# Patient Record
Sex: MALE | Race: WHITE | NOT HISPANIC OR LATINO | URBAN - METROPOLITAN AREA
[De-identification: names, ages, dates, MRNs, and addresses within clinical notes are randomized per-mention and may not be internally consistent; named-entity substitution may affect disease eponyms.]

---

## 2017-02-03 ENCOUNTER — OUTPATIENT (OUTPATIENT)
Dept: OUTPATIENT SERVICES | Facility: HOSPITAL | Age: 57
LOS: 1 days | Discharge: HOME | End: 2017-02-03

## 2017-05-13 ENCOUNTER — TRANSCRIPTION ENCOUNTER (OUTPATIENT)
Age: 57
End: 2017-05-13

## 2017-06-27 DIAGNOSIS — I49.9 CARDIAC ARRHYTHMIA, UNSPECIFIED: ICD-10-CM

## 2017-11-10 ENCOUNTER — OUTPATIENT (OUTPATIENT)
Dept: OUTPATIENT SERVICES | Facility: HOSPITAL | Age: 57
LOS: 1 days | Discharge: HOME | End: 2017-11-10

## 2017-11-10 DIAGNOSIS — Z00.8 ENCOUNTER FOR OTHER GENERAL EXAMINATION: ICD-10-CM

## 2017-12-08 ENCOUNTER — OUTPATIENT (OUTPATIENT)
Dept: OUTPATIENT SERVICES | Facility: HOSPITAL | Age: 57
LOS: 1 days | Discharge: HOME | End: 2017-12-08

## 2017-12-08 DIAGNOSIS — I49.9 CARDIAC ARRHYTHMIA, UNSPECIFIED: ICD-10-CM

## 2018-11-09 ENCOUNTER — OUTPATIENT (OUTPATIENT)
Dept: OUTPATIENT SERVICES | Facility: HOSPITAL | Age: 58
LOS: 1 days | Discharge: HOME | End: 2018-11-09

## 2018-11-09 DIAGNOSIS — I49.9 CARDIAC ARRHYTHMIA, UNSPECIFIED: ICD-10-CM

## 2018-11-09 DIAGNOSIS — Z00.8 ENCOUNTER FOR OTHER GENERAL EXAMINATION: ICD-10-CM

## 2018-11-09 LAB
ALBUMIN SERPL ELPH-MCNC: 5.2 G/DL — SIGNIFICANT CHANGE UP (ref 3.5–5.2)
ALP SERPL-CCNC: 84 U/L — SIGNIFICANT CHANGE UP (ref 30–115)
ALT FLD-CCNC: 15 U/L — SIGNIFICANT CHANGE UP (ref 0–41)
ANION GAP SERPL CALC-SCNC: 15 MMOL/L — HIGH (ref 7–14)
AST SERPL-CCNC: 15 U/L — SIGNIFICANT CHANGE UP (ref 0–41)
BILIRUB DIRECT SERPL-MCNC: <0.2 MG/DL — SIGNIFICANT CHANGE UP (ref 0–0.2)
BILIRUB INDIRECT FLD-MCNC: >0.1 MG/DL — LOW (ref 0.2–1.2)
BILIRUB SERPL-MCNC: 0.3 MG/DL — SIGNIFICANT CHANGE UP (ref 0.2–1.2)
BUN SERPL-MCNC: 19 MG/DL — SIGNIFICANT CHANGE UP (ref 10–20)
CALCIUM SERPL-MCNC: 10.2 MG/DL — HIGH (ref 8.5–10.1)
CHLORIDE SERPL-SCNC: 103 MMOL/L — SIGNIFICANT CHANGE UP (ref 98–110)
CHOLEST SERPL-MCNC: 236 MG/DL — HIGH (ref 100–200)
CO2 SERPL-SCNC: 22 MMOL/L — SIGNIFICANT CHANGE UP (ref 17–32)
CREAT SERPL-MCNC: 1.2 MG/DL — SIGNIFICANT CHANGE UP (ref 0.7–1.5)
ESTIMATED AVERAGE GLUCOSE: 126 MG/DL — HIGH (ref 68–114)
GGT SERPL-CCNC: 48 U/L — HIGH (ref 1–40)
GLUCOSE SERPL-MCNC: 96 MG/DL — SIGNIFICANT CHANGE UP (ref 70–99)
HBA1C BLD-MCNC: 6 % — HIGH (ref 4–5.6)
HCT VFR BLD CALC: 41.8 % — LOW (ref 42–52)
HDLC SERPL-MCNC: 52 MG/DL — SIGNIFICANT CHANGE UP
HGB BLD-MCNC: 14.2 G/DL — SIGNIFICANT CHANGE UP (ref 14–18)
LIPID PNL WITH DIRECT LDL SERPL: 178 MG/DL — HIGH (ref 4–129)
MAGNESIUM SERPL-MCNC: 1.9 MG/DL — SIGNIFICANT CHANGE UP (ref 1.8–2.4)
MCHC RBC-ENTMCNC: 29.9 PG — SIGNIFICANT CHANGE UP (ref 27–31)
MCHC RBC-ENTMCNC: 34 G/DL — SIGNIFICANT CHANGE UP (ref 32–37)
MCV RBC AUTO: 88 FL — SIGNIFICANT CHANGE UP (ref 80–94)
NRBC # BLD: 0 /100 WBCS — SIGNIFICANT CHANGE UP (ref 0–0)
PLATELET # BLD AUTO: 289 K/UL — SIGNIFICANT CHANGE UP (ref 130–400)
POTASSIUM SERPL-MCNC: 4.2 MMOL/L — SIGNIFICANT CHANGE UP (ref 3.5–5)
POTASSIUM SERPL-SCNC: 4.2 MMOL/L — SIGNIFICANT CHANGE UP (ref 3.5–5)
PROT SERPL-MCNC: 8.5 G/DL — HIGH (ref 6–8)
RBC # BLD: 4.75 M/UL — SIGNIFICANT CHANGE UP (ref 4.7–6.1)
RBC # FLD: 12.9 % — SIGNIFICANT CHANGE UP (ref 11.5–14.5)
SODIUM SERPL-SCNC: 140 MMOL/L — SIGNIFICANT CHANGE UP (ref 135–146)
TOTAL CHOLESTEROL/HDL RATIO MEASUREMENT: 4.5 RATIO — SIGNIFICANT CHANGE UP (ref 4–5.5)
TRIGL SERPL-MCNC: 134 MG/DL — SIGNIFICANT CHANGE UP (ref 10–149)
WBC # BLD: 9.89 K/UL — SIGNIFICANT CHANGE UP (ref 4.8–10.8)
WBC # FLD AUTO: 9.89 K/UL — SIGNIFICANT CHANGE UP (ref 4.8–10.8)

## 2018-11-10 LAB
HAV IGG SER QL IA: SIGNIFICANT CHANGE UP
HAV IGM SER-ACNC: SIGNIFICANT CHANGE UP
HAV IGM SER-ACNC: SIGNIFICANT CHANGE UP
HBV CORE AB SER-ACNC: SIGNIFICANT CHANGE UP
HBV CORE IGM SER-ACNC: SIGNIFICANT CHANGE UP
HBV SURFACE AB SER-ACNC: SIGNIFICANT CHANGE UP
HBV SURFACE AG SER-ACNC: SIGNIFICANT CHANGE UP
HBV SURFACE AG SER-ACNC: SIGNIFICANT CHANGE UP
HCV AB S/CO SERPL IA: 0.18 S/CO — SIGNIFICANT CHANGE UP
HCV AB SERPL-IMP: SIGNIFICANT CHANGE UP
T PALLIDUM AB TITR SER: NEGATIVE — SIGNIFICANT CHANGE UP

## 2018-11-12 LAB
APPEARANCE UR: CLEAR — SIGNIFICANT CHANGE UP
BILIRUB UR-MCNC: NEGATIVE — SIGNIFICANT CHANGE UP
COLOR SPEC: YELLOW — SIGNIFICANT CHANGE UP
DIFF PNL FLD: NEGATIVE — SIGNIFICANT CHANGE UP
GLUCOSE UR QL: NEGATIVE MG/DL — SIGNIFICANT CHANGE UP
KETONES UR-MCNC: NEGATIVE — SIGNIFICANT CHANGE UP
LEUKOCYTE ESTERASE UR-ACNC: NEGATIVE — SIGNIFICANT CHANGE UP
NITRITE UR-MCNC: NEGATIVE — SIGNIFICANT CHANGE UP
PH UR: 6 — SIGNIFICANT CHANGE UP (ref 5–8)
PROT UR-MCNC: NEGATIVE MG/DL — SIGNIFICANT CHANGE UP
SP GR SPEC: 1.02 — SIGNIFICANT CHANGE UP (ref 1.01–1.03)
UROBILINOGEN FLD QL: 0.2 MG/DL — SIGNIFICANT CHANGE UP (ref 0.2–0.2)

## 2018-12-07 ENCOUNTER — OUTPATIENT (OUTPATIENT)
Dept: OUTPATIENT SERVICES | Facility: HOSPITAL | Age: 58
LOS: 1 days | Discharge: HOME | End: 2018-12-07

## 2018-12-07 DIAGNOSIS — I49.9 CARDIAC ARRHYTHMIA, UNSPECIFIED: ICD-10-CM

## 2019-04-10 ENCOUNTER — RECORD ABSTRACTING (OUTPATIENT)
Age: 59
End: 2019-04-10

## 2019-04-10 DIAGNOSIS — E78.00 PURE HYPERCHOLESTEROLEMIA, UNSPECIFIED: ICD-10-CM

## 2019-04-10 DIAGNOSIS — Z82.49 FAMILY HISTORY OF ISCHEMIC HEART DISEASE AND OTHER DISEASES OF THE CIRCULATORY SYSTEM: ICD-10-CM

## 2019-04-10 DIAGNOSIS — E11.9 TYPE 2 DIABETES MELLITUS W/OUT COMPLICATIONS: ICD-10-CM

## 2019-04-10 PROBLEM — Z00.00 ENCOUNTER FOR PREVENTIVE HEALTH EXAMINATION: Status: ACTIVE | Noted: 2019-04-10

## 2019-04-29 ENCOUNTER — APPOINTMENT (OUTPATIENT)
Dept: CARDIOLOGY | Facility: CLINIC | Age: 59
End: 2019-04-29
Payer: COMMERCIAL

## 2019-04-29 PROCEDURE — 93000 ELECTROCARDIOGRAM COMPLETE: CPT

## 2019-04-29 PROCEDURE — 99214 OFFICE O/P EST MOD 30 MIN: CPT

## 2019-07-26 ENCOUNTER — INPATIENT (INPATIENT)
Facility: HOSPITAL | Age: 59
LOS: 3 days | Discharge: HOME | End: 2019-07-30
Attending: INTERNAL MEDICINE | Admitting: INTERNAL MEDICINE
Payer: COMMERCIAL

## 2019-07-26 VITALS
RESPIRATION RATE: 20 BRPM | OXYGEN SATURATION: 98 % | DIASTOLIC BLOOD PRESSURE: 59 MMHG | HEART RATE: 77 BPM | SYSTOLIC BLOOD PRESSURE: 108 MMHG | TEMPERATURE: 98 F

## 2019-07-26 DIAGNOSIS — Z98.890 OTHER SPECIFIED POSTPROCEDURAL STATES: Chronic | ICD-10-CM

## 2019-07-26 LAB
ALBUMIN SERPL ELPH-MCNC: 4.3 G/DL — SIGNIFICANT CHANGE UP (ref 3.5–5.2)
ALP SERPL-CCNC: 87 U/L — SIGNIFICANT CHANGE UP (ref 30–115)
ALT FLD-CCNC: 25 U/L — SIGNIFICANT CHANGE UP (ref 0–41)
ANION GAP SERPL CALC-SCNC: 18 MMOL/L — HIGH (ref 7–14)
APTT BLD: 30.8 SEC — SIGNIFICANT CHANGE UP (ref 27–39.2)
AST SERPL-CCNC: 20 U/L — SIGNIFICANT CHANGE UP (ref 0–41)
BASE EXCESS BLDV CALC-SCNC: -4.3 MMOL/L — LOW (ref -2–2)
BASOPHILS # BLD AUTO: 0.02 K/UL — SIGNIFICANT CHANGE UP (ref 0–0.2)
BASOPHILS NFR BLD AUTO: 0.2 % — SIGNIFICANT CHANGE UP (ref 0–1)
BILIRUB SERPL-MCNC: 0.3 MG/DL — SIGNIFICANT CHANGE UP (ref 0.2–1.2)
BLD GP AB SCN SERPL QL: SIGNIFICANT CHANGE UP
BUN SERPL-MCNC: 75 MG/DL — CRITICAL HIGH (ref 10–20)
CA-I SERPL-SCNC: 1.08 MMOL/L — LOW (ref 1.12–1.3)
CALCIUM SERPL-MCNC: 8.6 MG/DL — SIGNIFICANT CHANGE UP (ref 8.5–10.1)
CHLORIDE SERPL-SCNC: 92 MMOL/L — LOW (ref 98–110)
CK MB CFR SERPL CALC: 8.2 NG/ML — HIGH (ref 0.6–6.3)
CK SERPL-CCNC: 574 U/L — HIGH (ref 0–225)
CO2 SERPL-SCNC: 21 MMOL/L — SIGNIFICANT CHANGE UP (ref 17–32)
CREAT SERPL-MCNC: 7.7 MG/DL — CRITICAL HIGH (ref 0.7–1.5)
EOSINOPHIL # BLD AUTO: 0.16 K/UL — SIGNIFICANT CHANGE UP (ref 0–0.7)
EOSINOPHIL NFR BLD AUTO: 1.8 % — SIGNIFICANT CHANGE UP (ref 0–8)
GAS PNL BLDV: 131 MMOL/L — LOW (ref 136–145)
GAS PNL BLDV: SIGNIFICANT CHANGE UP
GAS PNL BLDV: SIGNIFICANT CHANGE UP
GLUCOSE SERPL-MCNC: 103 MG/DL — HIGH (ref 70–99)
HCO3 BLDV-SCNC: 22 MMOL/L — SIGNIFICANT CHANGE UP (ref 22–29)
HCT VFR BLD CALC: 33.9 % — LOW (ref 42–52)
HCT VFR BLDA CALC: 32.9 % — LOW (ref 34–44)
HGB BLD CALC-MCNC: 10.7 G/DL — LOW (ref 14–18)
HGB BLD-MCNC: 11.3 G/DL — LOW (ref 14–18)
IMM GRANULOCYTES NFR BLD AUTO: 0.6 % — HIGH (ref 0.1–0.3)
INR BLD: 1.09 RATIO — SIGNIFICANT CHANGE UP (ref 0.65–1.3)
LACTATE BLDV-MCNC: 0.7 MMOL/L — SIGNIFICANT CHANGE UP (ref 0.5–1.6)
LYMPHOCYTES # BLD AUTO: 1.56 K/UL — SIGNIFICANT CHANGE UP (ref 1.2–3.4)
LYMPHOCYTES # BLD AUTO: 17.9 % — LOW (ref 20.5–51.1)
MCHC RBC-ENTMCNC: 30.5 PG — SIGNIFICANT CHANGE UP (ref 27–31)
MCHC RBC-ENTMCNC: 33.3 G/DL — SIGNIFICANT CHANGE UP (ref 32–37)
MCV RBC AUTO: 91.4 FL — SIGNIFICANT CHANGE UP (ref 80–94)
MONOCYTES # BLD AUTO: 0.71 K/UL — HIGH (ref 0.1–0.6)
MONOCYTES NFR BLD AUTO: 8.2 % — SIGNIFICANT CHANGE UP (ref 1.7–9.3)
NEUTROPHILS # BLD AUTO: 6.2 K/UL — SIGNIFICANT CHANGE UP (ref 1.4–6.5)
NEUTROPHILS NFR BLD AUTO: 71.3 % — SIGNIFICANT CHANGE UP (ref 42.2–75.2)
NRBC # BLD: 0 /100 WBCS — SIGNIFICANT CHANGE UP (ref 0–0)
PCO2 BLDV: 46 MMHG — SIGNIFICANT CHANGE UP (ref 41–51)
PH BLDV: 7.29 — SIGNIFICANT CHANGE UP (ref 7.26–7.43)
PLATELET # BLD AUTO: 218 K/UL — SIGNIFICANT CHANGE UP (ref 130–400)
PO2 BLDV: 46 MMHG — HIGH (ref 20–40)
POTASSIUM BLDV-SCNC: 4.5 MMOL/L — SIGNIFICANT CHANGE UP (ref 3.3–5.6)
POTASSIUM SERPL-MCNC: 4.8 MMOL/L — SIGNIFICANT CHANGE UP (ref 3.5–5)
POTASSIUM SERPL-SCNC: 4.8 MMOL/L — SIGNIFICANT CHANGE UP (ref 3.5–5)
PROT SERPL-MCNC: 7.7 G/DL — SIGNIFICANT CHANGE UP (ref 6–8)
PROTHROM AB SERPL-ACNC: 12.5 SEC — SIGNIFICANT CHANGE UP (ref 9.95–12.87)
RBC # BLD: 3.71 M/UL — LOW (ref 4.7–6.1)
RBC # FLD: 12.7 % — SIGNIFICANT CHANGE UP (ref 11.5–14.5)
SAO2 % BLDV: 79 % — SIGNIFICANT CHANGE UP
SODIUM SERPL-SCNC: 131 MMOL/L — LOW (ref 135–146)
TROPONIN T SERPL-MCNC: 0.01 NG/ML — SIGNIFICANT CHANGE UP
WBC # BLD: 8.7 K/UL — SIGNIFICANT CHANGE UP (ref 4.8–10.8)
WBC # FLD AUTO: 8.7 K/UL — SIGNIFICANT CHANGE UP (ref 4.8–10.8)

## 2019-07-26 PROCEDURE — 76937 US GUIDE VASCULAR ACCESS: CPT | Mod: 26

## 2019-07-26 PROCEDURE — 99291 CRITICAL CARE FIRST HOUR: CPT | Mod: 25

## 2019-07-26 PROCEDURE — 36556 INSERT NON-TUNNEL CV CATH: CPT

## 2019-07-26 PROCEDURE — 71045 X-RAY EXAM CHEST 1 VIEW: CPT | Mod: 26

## 2019-07-26 PROCEDURE — 0042T: CPT

## 2019-07-26 PROCEDURE — 70450 CT HEAD/BRAIN W/O DYE: CPT | Mod: 26

## 2019-07-26 PROCEDURE — 93010 ELECTROCARDIOGRAM REPORT: CPT

## 2019-07-26 RX ORDER — NOREPINEPHRINE BITARTRATE/D5W 8 MG/250ML
0.05 PLASTIC BAG, INJECTION (ML) INTRAVENOUS
Qty: 8 | Refills: 0 | Status: DISCONTINUED | OUTPATIENT
Start: 2019-07-26 | End: 2019-07-29

## 2019-07-26 RX ORDER — MIDAZOLAM HYDROCHLORIDE 1 MG/ML
2 INJECTION, SOLUTION INTRAMUSCULAR; INTRAVENOUS ONCE
Refills: 0 | Status: DISCONTINUED | OUTPATIENT
Start: 2019-07-26 | End: 2019-07-26

## 2019-07-26 RX ORDER — SODIUM CHLORIDE 9 MG/ML
1000 INJECTION, SOLUTION INTRAVENOUS
Refills: 0 | Status: DISCONTINUED | OUTPATIENT
Start: 2019-07-26 | End: 2019-07-27

## 2019-07-26 RX ORDER — SODIUM CHLORIDE 9 MG/ML
1000 INJECTION, SOLUTION INTRAVENOUS ONCE
Refills: 0 | Status: COMPLETED | OUTPATIENT
Start: 2019-07-26 | End: 2019-07-26

## 2019-07-26 RX ORDER — LIDOCAINE HCL 20 MG/ML
5 VIAL (ML) INJECTION ONCE
Refills: 0 | Status: COMPLETED | OUTPATIENT
Start: 2019-07-26 | End: 2019-07-26

## 2019-07-26 RX ORDER — MIDAZOLAM HYDROCHLORIDE 1 MG/ML
4 INJECTION, SOLUTION INTRAMUSCULAR; INTRAVENOUS ONCE
Refills: 0 | Status: DISCONTINUED | OUTPATIENT
Start: 2019-07-26 | End: 2019-07-26

## 2019-07-26 RX ADMIN — SODIUM CHLORIDE 666.67 MILLILITER(S): 9 INJECTION, SOLUTION INTRAVENOUS at 20:30

## 2019-07-26 RX ADMIN — SODIUM CHLORIDE 150 MILLILITER(S): 9 INJECTION, SOLUTION INTRAVENOUS at 22:38

## 2019-07-26 RX ADMIN — ONDANSETRON 4 MILLIGRAM(S): 8 TABLET, FILM COATED ORAL at 22:02

## 2019-07-26 RX ADMIN — MIDAZOLAM HYDROCHLORIDE 2 MILLIGRAM(S): 1 INJECTION, SOLUTION INTRAMUSCULAR; INTRAVENOUS at 23:46

## 2019-07-26 RX ADMIN — Medication 5 MILLILITER(S): at 21:07

## 2019-07-26 NOTE — CONSULT NOTE ADULT - ATTENDING COMMENTS
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      Plan:  - correct electrolytes  - nephrology following  - consider klonopin 0.25 mg TID   - MRI Brain NC - may need klonopin prior to control jerks

## 2019-07-26 NOTE — ED PROVIDER NOTE - NS ED ROS FT
General: No fever, no chills  Eyes:  No visual changes, eye pain or discharge.  ENMT:  No hearing changes, pain, no sore throat or runny nose, no difficulty swallowing  Cardiac:  No chest pain, SOB or edema. No chest pain with exertion.  Respiratory:  No cough or respiratory distress. No hemoptysis. No history of asthma or RAD.  GI:  No nausea, vomiting, diarrhea or abdominal pain.  :  No dysuria, frequency or burning.  MS:  No myalgia, muscle weakness, joint pain or back pain.  Neuro:  + confusion, No headache or weakness.  No LOC.  Skin:  No skin rash.   Endocrine: No history of thyroid disease or diabetes.

## 2019-07-26 NOTE — ED ADULT TRIAGE NOTE - CHIEF COMPLAINT QUOTE
Pt brought in by family with confusion that started at 4pm and tremors Pt brought in by family with confusion that started at 4pm and tremors/ as per daughter, patient a slurred speech but resolved PTA

## 2019-07-26 NOTE — ED ADULT NURSE NOTE - CHIEF COMPLAINT QUOTE
Pt brought in by family with confusion that started at 4pm and tremors/ as per daughter, patient a slurred speech but resolved PTA

## 2019-07-26 NOTE — ED PROVIDER NOTE - CLINICAL SUMMARY MEDICAL DECISION MAKING FREE TEXT BOX
Patient has no facial droop and a non focal neuro exam.  Initial NIH 0-1.  Patient had emergent head CT and case discussed with Neuro Dr. Hernandez.  Patient has CT perfusion study done.  Stroke work up unremarkable. Patient found to have acute kidney injury with Creatinine 7.7.  K+ normal. Patient is not fluid overloaded and if anything appears dehydrated.  Bedside bladder US done and shows volume of 470.  Mitchell placed at bedside.  Patient hydrated and he is making urine output.  Complete blood panel sent.  Patients course complicated by hypotension.  Renal consulted and case discussed with Dr. Lefty Poole. ICU consulted for care. Femoral central line placed. Levophed started. Patient accepted to ICU for care.  No signs of DVT on exam.  Patients speech is improving.    Patient was not a t-PA candidate because of alternate diagnosis and low NIH score with recent fracture.  Patient is also not an interventional candidate as he has no LVO and a low NIH.

## 2019-07-26 NOTE — ED PROVIDER NOTE - PHYSICAL EXAMINATION
CONSTITUTIONAL: Well-developed; well nourished, + diffuse tremors, speech delayed  SKIN: warm, dry  HEAD: Normocephalic; atraumatic.  EYES: PERRL, EOMI, no conjunctival erythema  ENT: No nasal discharge; airway clear.  NECK: Supple; non tender.  CARD: S1, S2 normal; no murmurs, gallops, or rubs. Regular rate and rhythm.   RESP: No wheezes, rales or rhonchi.  ABD: soft ntnd  EXT: Normal ROM.  No clubbing, cyanosis or edema.   LYMPH: No acute cervical adenopathy.  NEURO: Alert, oriented x3, able to make wishes known, facial nerves grossly intact, muscle strength 5/5 in upper and lower extremities  PSYCH: Cooperative, appropriate.

## 2019-07-26 NOTE — ED PROVIDER NOTE - PMH
Ankle fracture  left ankel  Fx x 2 with surgical repair  Diabetes mellitus    High cholesterol    HTN (hypertension)

## 2019-07-26 NOTE — ED ADULT NURSE NOTE - NSIMPLEMENTINTERV_GEN_ALL_ED
Implemented All Fall with Harm Risk Interventions:  [place] to call system. Call bell, personal items and telephone within reach. Instruct patient to call for assistance. Room bathroom lighting operational. Non-slip footwear when patient is off stretcher. Physically safe environment: no spills, clutter or unnecessary equipment. Stretcher in lowest position, wheels locked, appropriate side rails in place. Provide visual cue, wrist band, yellow gown, etc. Monitor gait and stability. Monitor for mental status changes and reorient to person, place, and time. Review medications for side effects contributing to fall risk. Reinforce activity limits and safety measures with patient and family. Provide visual clues: red socks.

## 2019-07-26 NOTE — ED PROVIDER NOTE - OBJECTIVE STATEMENT
58 y.o. M with PMH HTN, DM with confusion for 4 hours. sudden in onset + delayed speech, + tremlousness, no weakness numbness tingling, no vision changes, no fever, no chills, + syncopal episode with fall 2 days ago with cast placed in the right leg unwitnessed, unknown if LOC, unknown if head trauma. Pt from home, family states is much different than baseline. 58 y.o. M with PMH HTN, DM with confusion for 4 hours. sudden in onset + delayed speech, + tremulousness, no weakness numbness tingling, no vision changes, no fever, no chills, + syncopal episode with fall 2 days ago with cast placed in the right leg unwitnessed, unknown if LOC, unknown if head trauma. Pt from home, family states is much different than baseline.

## 2019-07-26 NOTE — ED PROVIDER NOTE - PROGRESS NOTE DETAILS
Pt MAP 62, will give LR Pt back from CT, resting comfortably, resting tremor noted, confusion noted. Will continue to monitor, labs reviewed Bryan spoken to wll get kidney KUB ICU admission, Pt became hypotensive, started on low dose levophed, Central line

## 2019-07-26 NOTE — CONSULT NOTE ADULT - ASSESSMENT
59 YO Male with pmhx of HTN, DM recent leg Fx s/p casting presented with confusion  p/w acute confusion tremulousness and  mild dysarthria. As per daughter father was fine at 10am and called them at 4pm confused and c/o not feeling well. On initial ED eval patient was an NIHSS score of slow/slurred speech. CTH and CTA/P was negative for acute findings. Patient is not a  TPA candidate as he was out of the therapeutic window as per the time line given by the daughter at bedside putting the last known well  time to 10am. On my eval at 8 pm patient was a/o x 3 with slow speech and nihss0. Has ue/le  asterixis on exam and has urinary retention on bladder scan. EKG NSR ,At this  time labs reviewed which revealed BUN 75  Cr 7.7  GFR 7 s/o MILAN  . /59          Symptoms likely secondary to Uremic encephalopathy and not likely due to CVA.        Plan  Please start IV hydration  Obtain nephrology consultation urgently   REEG  Neuro attending note will follow 59 YO Male with pmhx of HTN, DM recent leg Fx s/p casting presented with confusion  p/w acute confusion tremulousness and  mild dysarthria. As per daughter father was fine at 10am and called them at 4pm confused and c/o not feeling well. On initial ED eval patient was an NIHSS score of slow/slurred speech. CTH and CTA/P was negative for acute findings. Patient is not a  TPA candidate as he was out of the therapeutic window as per the time line given by the daughter at bedside putting the last known well  time to 10am. On my eval at 8 pm patient was a/o x 3 with slow speech and nihss0. Has ue/le  asterixis on exam and has urinary retention on bladder scan. EKG NSR ,At this  time labs reviewed which revealed BUN 75  Cr 7.7  GFR 7 s/o MILAN  . 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    Plan  Please start IV hydration  Obtain nephrology consultation urgently   REEG  Neuro attending note will follow

## 2019-07-26 NOTE — CONSULT NOTE ADULT - SUBJECTIVE AND OBJECTIVE BOX
CC: Confusion        HPI:   59 YO Male with pmhx of HTN, DM presented with confusion. As per daughter at bedside patient has been having whole body tremulousness since past several days. He was fine when she left him at 10am to go to the store and around 4 pm patient called the daughter saying that he is not feeling well and feeling confused. When daughter reached home patient was confused , he did not know where his tooth brush or routine medications were, his hands kept shaking and he was dropping his fork while eating due to that , and his speech was slow as compared to usual baseline. Denies weakness numbness dizziness headache, n/v fever or chills.  Fall 2 days ago with fracture resulting in right LE cast. Fall was unwitnessed, unknown if LOC, unknown if head trauma. On arrival to ed a stroke code was activated.     Home Medications:      Social history  Denies smoking alcohol or drug use    Family history  Denies significant family history    Neuro Exam:  Orientation: oriented to person, place time , able to name and recognize daughter, speech is slow but fluent , able to repeat words and sentences, anxious  Cranial Nerves:  visual fields full to confrontation, pupils equal round and reactive to light, extraocular motion intact, facial sensation intact symmetrically, face symmetrical, hearing was intact bilaterally, tongue and palate midline, head turning and shoulder shrug symmetric and there was no tongue deviation with protrusion.   Motor: 5/5 throughout.             B/ ue/le asterixis noted  Sensory exam:  Intact.   Coordination:. No dysmetria or ataxia   gait: deferred  no neglect       NIHSS: 0    Allergies    No Known Allergies    Intolerances      MEDICATIONS  (STANDING):  lidocaine 2% Jelly 5 milliLiter(s) IntraUrethral Once    MEDICATIONS  (PRN):      LABS:                        11.3   8.70  )-----------( 218      ( 26 Jul 2019 19:39 )             33.9     07-26    131<L>  |  92<L>  |  75<HH>  ----------------------------<  103<H>  4.8   |  21  |  7.7<HH>    Ca    8.6      26 Jul 2019 19:39    TPro  7.7  /  Alb  4.3  /  TBili  0.3  /  DBili  x   /  AST  20  /  ALT  25  /  AlkPhos  87  07-26    PT/INR - ( 26 Jul 2019 19:39 )   PT: 12.50 sec;   INR: 1.09 ratio         PTT - ( 26 Jul 2019 19:39 )  PTT:30.8 sec        Neuro Imaging:  < from: CT Brain Stroke Protocol (07.26.19 @ 20:32) >  FINDINGS:    The cortical sulci and ventricular system are  symmetrical and consistent   with the patient's age.      No acute intracranial hemorrhage. No mass effect, midline shift, or extra   axial  fluid collection.     Gray-white differentiation is maintained.     The bones are intact without depressed skull fracture.         IMPRESSION:    No CT evidence of acute intracranial pathology      < from: CT Perfusion w/ Maps w/ IV Cont (07.26.19 @ 20:40) >  Findings:  Included aortic arch is unremarkable. Origins of the great vessels are   unremarkable.    Common carotid arteries are normally patent. Right common carotid   bifurcation is unremarkable. Mild atherosclerotic calcification seen at   the left carotid bifurcation.    There is a normal pattern of the bilateral ECA.    Distal cervical internal carotid arteries are normally patent.    Mild atherosclerotic change of the bilateral cavernous ICA, likely   without flow limitation.    M1 and M2 branches of the bilateral MCA as well as A1 and A2 branches of   the bilateral PARAS are normally patent.    Origins of the bilateral vertebral arteries are obscuredby streak   artifact and beam hardening from overlying shoulders; proximal vertebral   arteries are normally patent.    Basilar artery is normally patent. Bilateral SCA and PCA vessels are   normally patent.    No focal stenosis or aneurysm identified at this time.    On rapid perfusion imaging, no decrease in cerebral blood flow or   increased time to peak to suggest core infarct or ischemic penumbra.    Impression:    Unremarkable CTA of the neck and brain; no focal stenosis or aneurysm   identified at this time.    No decrease in cerebral blood flow or increased time to peak to suggest   core infarct or ischemic penumbra.            EEG:     Echo:   Carotid Doppler: N/A  Telemetry: CC: Confusion    HPI:   59 YO Male with pmhx of HTN, DM presented with confusion. As per daughter at bedside patient has been having whole body tremulousness since past several days. He was fine when she left him at 10am to go to the store and around 4 pm patient called the daughter saying that he is not feeling well and feeling confused. When daughter reached home patient was confused , he did not know where his tooth brush or routine medications were, his hands kept shaking and he was dropping his fork while eating due to that , and his speech was slow as compared to usual baseline. Denies weakness numbness dizziness headache, n/v fever or chills.  Fall 2 days ago with fracture resulting in right LE cast. Fall was unwitnessed, unknown if LOC, unknown if head trauma. On arrival to ed a stroke code was activated.  Has been having jerking/tremulousness over the last 2 weeks.  Denies any new medications or pain medications.  had been taking NSAIDs in regular doses.      Home Medications:    Social history  Denies smoking alcohol or drug use    Family history  Denies significant family history    Neuro Exam:  Orientation: oriented to person, place time , able to name and recognize daughter, speech is slow but fluent , able to repeat words and sentences, anxious  Cranial Nerves:  visual fields full to confrontation, pupils equal round and reactive to light, extraocular motion intact, facial sensation intact symmetrically, face symmetrical, hearing was intact bilaterally, tongue and palate midline, head turning and shoulder shrug symmetric and there was no tongue deviation with protrusion.   Motor: 5/5 throughout.             B/ ue/le asterixis noted  Sensory exam:  Intact.   Coordination:. No dysmetria or ataxia   gait: deferred  no neglect     NIHSS: 0    Allergies    No Known Allergies    Intolerances    MEDICATIONS  (STANDING):  lidocaine 2% Jelly 5 milliLiter(s) IntraUrethral Once    MEDICATIONS  (PRN):    LABS:                        11.3   8.70  )-----------( 218      ( 26 Jul 2019 19:39 )             33.9     07-26    131<L>  |  92<L>  |  75<HH>  ----------------------------<  103<H>  4.8   |  21  |  7.7<HH>    Ca    8.6      26 Jul 2019 19:39    TPro  7.7  /  Alb  4.3  /  TBili  0.3  /  DBili  x   /  AST  20  /  ALT  25  /  AlkPhos  87  07-26    PT/INR - ( 26 Jul 2019 19:39 )   PT: 12.50 sec;   INR: 1.09 ratio         PTT - ( 26 Jul 2019 19:39 )  PTT:30.8 sec        Neuro Imaging:  < from: CT Brain Stroke Protocol (07.26.19 @ 20:32) >  FINDINGS:    The cortical sulci and ventricular system are  symmetrical and consistent   with the patient's age.      No acute intracranial hemorrhage. No mass effect, midline shift, or extra   axial  fluid collection.     Gray-white differentiation is maintained.     The bones are intact without depressed skull fracture.         IMPRESSION:    No CT evidence of acute intracranial pathology      < from: CT Perfusion w/ Maps w/ IV Cont (07.26.19 @ 20:40) >  Findings:  Included aortic arch is unremarkable. Origins of the great vessels are   unremarkable.    Common carotid arteries are normally patent. Right common carotid   bifurcation is unremarkable. Mild atherosclerotic calcification seen at   the left carotid bifurcation.    There is a normal pattern of the bilateral ECA.    Distal cervical internal carotid arteries are normally patent.    Mild atherosclerotic change of the bilateral cavernous ICA, likely   without flow limitation.    M1 and M2 branches of the bilateral MCA as well as A1 and A2 branches of   the bilateral PARAS are normally patent.    Origins of the bilateral vertebral arteries are obscuredby streak   artifact and beam hardening from overlying shoulders; proximal vertebral   arteries are normally patent.    Basilar artery is normally patent. Bilateral SCA and PCA vessels are   normally patent.    No focal stenosis or aneurysm identified at this time.    On rapid perfusion imaging, no decrease in cerebral blood flow or   increased time to peak to suggest core infarct or ischemic penumbra.    Impression:    Unremarkable CTA of the neck and brain; no focal stenosis or aneurysm   identified at this time.    No decrease in cerebral blood flow or increased time to peak to suggest   core infarct or ischemic penumbra.            EEG:     Echo:   Carotid Doppler: N/A  Telemetry:

## 2019-07-26 NOTE — ED ADULT NURSE NOTE - CHPI ED NUR SYMPTOMS NEG
no weakness/no blurred vision/no dizziness/no change in level of consciousness/no fever/no nausea/no numbness

## 2019-07-26 NOTE — ED PROVIDER NOTE - CARE PLAN
Principal Discharge DX:	MILAN (acute kidney injury)  Secondary Diagnosis:	Altered mental state  Secondary Diagnosis:	Hypotension

## 2019-07-26 NOTE — ED ADULT NURSE NOTE - OBJECTIVE STATEMENT
Family reports patient sis not acting like himself, talking slower, not remembering details. having slight tremors in the hands, not holding the phone appropriately. Patient fell two days prior, sustained .0 Family reports patient sis not acting like himself, talking slower, not remembering details. having slight tremors in the hands, not holding the phone appropriately. Patient fell two days prior, sustained right ankle fracture, denies hitting his head. Patient reports having new onset tremors to upper extremities for a few weeks, unable to hold the phone, had a period of confusion earlier today.

## 2019-07-26 NOTE — ED ADULT NURSE NOTE - ED STAT RN HANDOFF DETAILS
Patient endorsed to relief nurse Scott. Patient maintaining vitals on ordered drips, NAD, seen by ICU Unit team.

## 2019-07-26 NOTE — CHART NOTE - NSCHARTNOTEFT_GEN_A_CORE
called for stroke code:  59 yo M w/ recent leg Fx s/p casting p/w acute confusion with mild dysarthria otherwise nonfocal per ED attending.  LKNT 16:00 EDT.  HCT pending.  per ED attending NIHSS 0 - 1 with minimal dysarthria but able to answer appropriately but with some bradyphrenia.  Pt is not a candidate for thrombolysis with IV TPA within 3 hour window since LKNT > 3 hrs.  Currently not a candidate for thrombolysis within 4.5 hr window with borderline normal NIHSS.  Recommend proceed with HCT and CTP/CTA to evaluate for LVO.  Recommend serial neurochecks during TPA window and if symptoms progress, recommend contact us immediately for re-evaluation for possible TPA, intervention.  Discussed with ED attending who agrees with current management and plan.

## 2019-07-26 NOTE — ED PROVIDER NOTE - ATTENDING CONTRIBUTION TO CARE
57 y/o M with PMH HTN, DM, recently began taking Methadone who presents with confusion and trouble with speech for 4 hours. Stroke Code called upon arrival. Patient had sudden in onset + delayed speech, + tremulousness, no weakness numbness tingling, no vision changes, no fever, no chills, + syncopal episode with fall 2 days ago with cast placed in the right leg unwitnessed, unknown if LOC, unknown if head trauma. Pt from home, family states is much different than baseline.    Patient has no facial droop and a non focal neuro exam.  Initial NIH 0-1.  Patient had emergent head CT and case discussed with Neuro Dr. Hernandez.  Patient has CT perfusion study done.  Stroke work up unremarkable. Patient found to have acute kidney injury with Creatinine 7.7.  K+ normal. Patient is not fluid overloaded and if anything appears dehydrated.  Bedside bladder US done and shows volume of 470.  Mitchell placed at bedside.  Patient hydrated and he is making urine output.  Complete blood panel sent.  Patients course complicated by hypotension.  Renal consulted and case discussed with Dr. Lefty Poole. ICU consulted for care. Femoral central line placed. Levophed started. Patient accepted to ICU for care.  No signs of DVT on exam.  Patients speech is improving.    Patient was not a t-PA candidate because of alternate diagnosis and low NIH score with recent fracture.  Patient is also not an interventional candidate as he has no LVO and a low NIH.

## 2019-07-27 LAB
AMMONIA BLD-MCNC: 41 UMOL/L — SIGNIFICANT CHANGE UP (ref 11–55)
AMPHET UR-MCNC: NEGATIVE — SIGNIFICANT CHANGE UP
ANION GAP SERPL CALC-SCNC: 13 MMOL/L — SIGNIFICANT CHANGE UP (ref 7–14)
ANION GAP SERPL CALC-SCNC: 19 MMOL/L — HIGH (ref 7–14)
ANION GAP SERPL CALC-SCNC: 20 MMOL/L — HIGH (ref 7–14)
APPEARANCE UR: CLEAR — SIGNIFICANT CHANGE UP
BARBITURATES UR SCN-MCNC: NEGATIVE — SIGNIFICANT CHANGE UP
BASOPHILS # BLD AUTO: 0.03 K/UL — SIGNIFICANT CHANGE UP (ref 0–0.2)
BASOPHILS NFR BLD AUTO: 0.4 % — SIGNIFICANT CHANGE UP (ref 0–1)
BENZODIAZ UR-MCNC: POSITIVE
BILIRUB UR-MCNC: NEGATIVE — SIGNIFICANT CHANGE UP
BUN SERPL-MCNC: 55 MG/DL — HIGH (ref 10–20)
BUN SERPL-MCNC: 68 MG/DL — CRITICAL HIGH (ref 10–20)
BUN SERPL-MCNC: 73 MG/DL — CRITICAL HIGH (ref 10–20)
CALCIUM SERPL-MCNC: 8.2 MG/DL — LOW (ref 8.5–10.1)
CALCIUM SERPL-MCNC: 8.6 MG/DL — SIGNIFICANT CHANGE UP (ref 8.5–10.1)
CALCIUM SERPL-MCNC: 8.8 MG/DL — SIGNIFICANT CHANGE UP (ref 8.5–10.1)
CHLORIDE SERPL-SCNC: 100 MMOL/L — SIGNIFICANT CHANGE UP (ref 98–110)
CHLORIDE SERPL-SCNC: 93 MMOL/L — LOW (ref 98–110)
CHLORIDE SERPL-SCNC: 97 MMOL/L — LOW (ref 98–110)
CK MB CFR SERPL CALC: 7.1 NG/ML — HIGH (ref 0.6–6.3)
CK SERPL-CCNC: 477 U/L — HIGH (ref 0–225)
CO2 SERPL-SCNC: 18 MMOL/L — SIGNIFICANT CHANGE UP (ref 17–32)
CO2 SERPL-SCNC: 20 MMOL/L — SIGNIFICANT CHANGE UP (ref 17–32)
CO2 SERPL-SCNC: 23 MMOL/L — SIGNIFICANT CHANGE UP (ref 17–32)
COCAINE METAB.OTHER UR-MCNC: NEGATIVE — SIGNIFICANT CHANGE UP
COLOR SPEC: SIGNIFICANT CHANGE UP
CREAT ?TM UR-MCNC: 98 MG/DL — SIGNIFICANT CHANGE UP
CREAT SERPL-MCNC: 3.5 MG/DL — HIGH (ref 0.7–1.5)
CREAT SERPL-MCNC: 5.7 MG/DL — CRITICAL HIGH (ref 0.7–1.5)
CREAT SERPL-MCNC: 7.1 MG/DL — CRITICAL HIGH (ref 0.7–1.5)
D DIMER BLD IA.RAPID-MCNC: 856 NG/ML DDU — HIGH (ref 0–230)
DIFF PNL FLD: SIGNIFICANT CHANGE UP
DRUG SCREEN 1, URINE RESULT: SIGNIFICANT CHANGE UP
EOSINOPHIL # BLD AUTO: 0.14 K/UL — SIGNIFICANT CHANGE UP (ref 0–0.7)
EOSINOPHIL NFR BLD AUTO: 1.7 % — SIGNIFICANT CHANGE UP (ref 0–8)
GLUCOSE BLDC GLUCOMTR-MCNC: 135 MG/DL — HIGH (ref 70–99)
GLUCOSE BLDC GLUCOMTR-MCNC: 151 MG/DL — HIGH (ref 70–99)
GLUCOSE BLDC GLUCOMTR-MCNC: 72 MG/DL — SIGNIFICANT CHANGE UP (ref 70–99)
GLUCOSE BLDC GLUCOMTR-MCNC: 90 MG/DL — SIGNIFICANT CHANGE UP (ref 70–99)
GLUCOSE SERPL-MCNC: 105 MG/DL — HIGH (ref 70–99)
GLUCOSE SERPL-MCNC: 83 MG/DL — SIGNIFICANT CHANGE UP (ref 70–99)
GLUCOSE SERPL-MCNC: 86 MG/DL — SIGNIFICANT CHANGE UP (ref 70–99)
GLUCOSE UR QL: NEGATIVE — SIGNIFICANT CHANGE UP
HCT VFR BLD CALC: 31.1 % — LOW (ref 42–52)
HGB BLD-MCNC: 10.3 G/DL — LOW (ref 14–18)
IMM GRANULOCYTES NFR BLD AUTO: 0.2 % — SIGNIFICANT CHANGE UP (ref 0.1–0.3)
KETONES UR-MCNC: NEGATIVE — SIGNIFICANT CHANGE UP
LEUKOCYTE ESTERASE UR-ACNC: NEGATIVE — SIGNIFICANT CHANGE UP
LIDOCAIN IGE QN: 17 U/L — SIGNIFICANT CHANGE UP (ref 7–60)
LYMPHOCYTES # BLD AUTO: 1.49 K/UL — SIGNIFICANT CHANGE UP (ref 1.2–3.4)
LYMPHOCYTES # BLD AUTO: 18 % — LOW (ref 20.5–51.1)
MAGNESIUM SERPL-MCNC: 2.4 MG/DL — SIGNIFICANT CHANGE UP (ref 1.8–2.4)
MCHC RBC-ENTMCNC: 30.1 PG — SIGNIFICANT CHANGE UP (ref 27–31)
MCHC RBC-ENTMCNC: 33.1 G/DL — SIGNIFICANT CHANGE UP (ref 32–37)
MCV RBC AUTO: 90.9 FL — SIGNIFICANT CHANGE UP (ref 80–94)
METHADONE UR-MCNC: POSITIVE
MONOCYTES # BLD AUTO: 0.82 K/UL — HIGH (ref 0.1–0.6)
MONOCYTES NFR BLD AUTO: 9.9 % — HIGH (ref 1.7–9.3)
NEUTROPHILS # BLD AUTO: 5.78 K/UL — SIGNIFICANT CHANGE UP (ref 1.4–6.5)
NEUTROPHILS NFR BLD AUTO: 69.8 % — SIGNIFICANT CHANGE UP (ref 42.2–75.2)
NITRITE UR-MCNC: NEGATIVE — SIGNIFICANT CHANGE UP
NRBC # BLD: 0 /100 WBCS — SIGNIFICANT CHANGE UP (ref 0–0)
NT-PROBNP SERPL-SCNC: 567 PG/ML — HIGH (ref 0–300)
OPIATES UR-MCNC: NEGATIVE — SIGNIFICANT CHANGE UP
OSMOLALITY SERPL: 304 MOSMOL/KG — HIGH (ref 275–300)
PCP UR-MCNC: NEGATIVE — SIGNIFICANT CHANGE UP
PH UR: 6 — SIGNIFICANT CHANGE UP (ref 5–8)
PHOSPHATE SERPL-MCNC: 5.2 MG/DL — HIGH (ref 2.1–4.9)
PLATELET # BLD AUTO: 239 K/UL — SIGNIFICANT CHANGE UP (ref 130–400)
POTASSIUM SERPL-MCNC: 4.2 MMOL/L — SIGNIFICANT CHANGE UP (ref 3.5–5)
POTASSIUM SERPL-MCNC: 4.8 MMOL/L — SIGNIFICANT CHANGE UP (ref 3.5–5)
POTASSIUM SERPL-MCNC: 4.9 MMOL/L — SIGNIFICANT CHANGE UP (ref 3.5–5)
POTASSIUM SERPL-SCNC: 4.2 MMOL/L — SIGNIFICANT CHANGE UP (ref 3.5–5)
POTASSIUM SERPL-SCNC: 4.8 MMOL/L — SIGNIFICANT CHANGE UP (ref 3.5–5)
POTASSIUM SERPL-SCNC: 4.9 MMOL/L — SIGNIFICANT CHANGE UP (ref 3.5–5)
PROPOXYPHENE QUALITATIVE URINE RESULT: NEGATIVE — SIGNIFICANT CHANGE UP
PROT UR-MCNC: SIGNIFICANT CHANGE UP
RBC # BLD: 3.42 M/UL — LOW (ref 4.7–6.1)
RBC # FLD: 12.5 % — SIGNIFICANT CHANGE UP (ref 11.5–14.5)
SODIUM SERPL-SCNC: 133 MMOL/L — LOW (ref 135–146)
SODIUM SERPL-SCNC: 134 MMOL/L — LOW (ref 135–146)
SODIUM SERPL-SCNC: 136 MMOL/L — SIGNIFICANT CHANGE UP (ref 135–146)
SODIUM UR-SCNC: 64 MMOL/L — SIGNIFICANT CHANGE UP
SP GR SPEC: 1.02 — SIGNIFICANT CHANGE UP (ref 1.01–1.02)
THC UR QL: NEGATIVE — SIGNIFICANT CHANGE UP
TROPONIN T SERPL-MCNC: <0.01 NG/ML — SIGNIFICANT CHANGE UP
TSH SERPL-MCNC: 2.28 UIU/ML — SIGNIFICANT CHANGE UP (ref 0.27–4.2)
UROBILINOGEN FLD QL: SIGNIFICANT CHANGE UP
UUN UR-MCNC: 560 MG/DL — SIGNIFICANT CHANGE UP
WBC # BLD: 8.28 K/UL — SIGNIFICANT CHANGE UP (ref 4.8–10.8)
WBC # FLD AUTO: 8.28 K/UL — SIGNIFICANT CHANGE UP (ref 4.8–10.8)

## 2019-07-27 PROCEDURE — 99254 IP/OBS CNSLTJ NEW/EST MOD 60: CPT

## 2019-07-27 PROCEDURE — 74176 CT ABD & PELVIS W/O CONTRAST: CPT | Mod: 26

## 2019-07-27 PROCEDURE — 93970 EXTREMITY STUDY: CPT | Mod: 26

## 2019-07-27 PROCEDURE — 95819 EEG AWAKE AND ASLEEP: CPT | Mod: 26

## 2019-07-27 RX ORDER — CYCLOBENZAPRINE HYDROCHLORIDE 10 MG/1
10 TABLET, FILM COATED ORAL AT BEDTIME
Refills: 0 | Status: DISCONTINUED | OUTPATIENT
Start: 2019-07-27 | End: 2019-07-30

## 2019-07-27 RX ORDER — METHADONE HYDROCHLORIDE 40 MG/1
80 TABLET ORAL DAILY
Refills: 0 | Status: DISCONTINUED | OUTPATIENT
Start: 2019-07-27 | End: 2019-07-30

## 2019-07-27 RX ORDER — CYCLOBENZAPRINE HYDROCHLORIDE 10 MG/1
1 TABLET, FILM COATED ORAL
Qty: 0 | Refills: 0 | DISCHARGE

## 2019-07-27 RX ORDER — ACETAMINOPHEN 500 MG
650 TABLET ORAL EVERY 6 HOURS
Refills: 0 | Status: DISCONTINUED | OUTPATIENT
Start: 2019-07-27 | End: 2019-07-30

## 2019-07-27 RX ORDER — GABAPENTIN 400 MG/1
400 CAPSULE ORAL AT BEDTIME
Refills: 0 | Status: DISCONTINUED | OUTPATIENT
Start: 2019-07-27 | End: 2019-07-28

## 2019-07-27 RX ORDER — METHADONE HYDROCHLORIDE 40 MG/1
60 TABLET ORAL
Qty: 0 | Refills: 0 | DISCHARGE

## 2019-07-27 RX ORDER — INSULIN HUMAN 100 [IU]/ML
10 INJECTION, SOLUTION SUBCUTANEOUS
Qty: 100 | Refills: 0 | Status: DISCONTINUED | OUTPATIENT
Start: 2019-07-27 | End: 2019-07-27

## 2019-07-27 RX ORDER — SODIUM CHLORIDE 9 MG/ML
1000 INJECTION, SOLUTION INTRAVENOUS ONCE
Refills: 0 | Status: COMPLETED | OUTPATIENT
Start: 2019-07-27 | End: 2019-07-27

## 2019-07-27 RX ORDER — IPRATROPIUM/ALBUTEROL SULFATE 18-103MCG
3 AEROSOL WITH ADAPTER (GRAM) INHALATION EVERY 4 HOURS
Refills: 0 | Status: DISCONTINUED | OUTPATIENT
Start: 2019-07-27 | End: 2019-07-29

## 2019-07-27 RX ORDER — SODIUM CHLORIDE 9 MG/ML
1000 INJECTION, SOLUTION INTRAVENOUS
Refills: 0 | Status: DISCONTINUED | OUTPATIENT
Start: 2019-07-27 | End: 2019-07-28

## 2019-07-27 RX ORDER — ONDANSETRON 8 MG/1
4 TABLET, FILM COATED ORAL ONCE
Refills: 0 | Status: COMPLETED | OUTPATIENT
Start: 2019-07-27 | End: 2019-07-27

## 2019-07-27 RX ORDER — METHADONE HYDROCHLORIDE 40 MG/1
80 TABLET ORAL DAILY
Refills: 0 | Status: DISCONTINUED | OUTPATIENT
Start: 2019-07-27 | End: 2019-07-29

## 2019-07-27 RX ORDER — SIMVASTATIN 20 MG/1
0 TABLET, FILM COATED ORAL
Qty: 0 | Refills: 0 | DISCHARGE

## 2019-07-27 RX ORDER — SODIUM CHLORIDE 9 MG/ML
1000 INJECTION, SOLUTION INTRAVENOUS
Refills: 0 | Status: DISCONTINUED | OUTPATIENT
Start: 2019-07-27 | End: 2019-07-27

## 2019-07-27 RX ORDER — SENNA PLUS 8.6 MG/1
2 TABLET ORAL AT BEDTIME
Refills: 0 | Status: DISCONTINUED | OUTPATIENT
Start: 2019-07-27 | End: 2019-07-30

## 2019-07-27 RX ORDER — METFORMIN HYDROCHLORIDE 850 MG/1
1 TABLET ORAL
Qty: 0 | Refills: 0 | DISCHARGE

## 2019-07-27 RX ORDER — ONDANSETRON 8 MG/1
4 TABLET, FILM COATED ORAL ONCE
Refills: 0 | Status: COMPLETED | OUTPATIENT
Start: 2019-07-27 | End: 2019-07-26

## 2019-07-27 RX ORDER — HEPARIN SODIUM 5000 [USP'U]/ML
5000 INJECTION INTRAVENOUS; SUBCUTANEOUS EVERY 8 HOURS
Refills: 0 | Status: DISCONTINUED | OUTPATIENT
Start: 2019-07-27 | End: 2019-07-30

## 2019-07-27 RX ORDER — CALCIUM GLUCONATE 100 MG/ML
1 VIAL (ML) INTRAVENOUS ONCE
Refills: 0 | Status: COMPLETED | OUTPATIENT
Start: 2019-07-27 | End: 2019-07-27

## 2019-07-27 RX ORDER — SIMVASTATIN 20 MG/1
1 TABLET, FILM COATED ORAL
Qty: 0 | Refills: 0 | DISCHARGE

## 2019-07-27 RX ORDER — HYDROCHLOROTHIAZIDE 25 MG
0 TABLET ORAL
Qty: 0 | Refills: 0 | DISCHARGE

## 2019-07-27 RX ORDER — METFORMIN HYDROCHLORIDE 850 MG/1
0 TABLET ORAL
Qty: 0 | Refills: 0 | DISCHARGE

## 2019-07-27 RX ORDER — SIMVASTATIN 20 MG/1
20 TABLET, FILM COATED ORAL AT BEDTIME
Refills: 0 | Status: DISCONTINUED | OUTPATIENT
Start: 2019-07-27 | End: 2019-07-30

## 2019-07-27 RX ORDER — DOCUSATE SODIUM 100 MG
100 CAPSULE ORAL THREE TIMES A DAY
Refills: 0 | Status: DISCONTINUED | OUTPATIENT
Start: 2019-07-27 | End: 2019-07-30

## 2019-07-27 RX ORDER — METHADONE HYDROCHLORIDE 40 MG/1
80 TABLET ORAL
Qty: 0 | Refills: 0 | DISCHARGE

## 2019-07-27 RX ADMIN — HEPARIN SODIUM 5000 UNIT(S): 5000 INJECTION INTRAVENOUS; SUBCUTANEOUS at 16:36

## 2019-07-27 RX ADMIN — CYCLOBENZAPRINE HYDROCHLORIDE 10 MILLIGRAM(S): 10 TABLET, FILM COATED ORAL at 22:51

## 2019-07-27 RX ADMIN — Medication 10.29 MICROGRAM(S)/KG/MIN: at 00:27

## 2019-07-27 RX ADMIN — Medication 200 GRAM(S): at 01:34

## 2019-07-27 RX ADMIN — SODIUM CHLORIDE 1000 MILLILITER(S): 9 INJECTION, SOLUTION INTRAVENOUS at 06:55

## 2019-07-27 RX ADMIN — SIMVASTATIN 20 MILLIGRAM(S): 20 TABLET, FILM COATED ORAL at 22:41

## 2019-07-27 RX ADMIN — ONDANSETRON 4 MILLIGRAM(S): 8 TABLET, FILM COATED ORAL at 01:10

## 2019-07-27 RX ADMIN — Medication 100 MILLIGRAM(S): at 22:01

## 2019-07-27 RX ADMIN — Medication 100 MILLIGRAM(S): at 05:48

## 2019-07-27 RX ADMIN — Medication 3 MILLILITER(S): at 05:53

## 2019-07-27 RX ADMIN — SODIUM CHLORIDE 75 MILLILITER(S): 9 INJECTION, SOLUTION INTRAVENOUS at 11:43

## 2019-07-27 RX ADMIN — METHADONE HYDROCHLORIDE 80 MILLIGRAM(S): 40 TABLET ORAL at 16:35

## 2019-07-27 RX ADMIN — Medication 650 MILLIGRAM(S): at 14:08

## 2019-07-27 RX ADMIN — HEPARIN SODIUM 5000 UNIT(S): 5000 INJECTION INTRAVENOUS; SUBCUTANEOUS at 05:47

## 2019-07-27 RX ADMIN — Medication 100 MILLIGRAM(S): at 16:36

## 2019-07-27 RX ADMIN — SENNA PLUS 2 TABLET(S): 8.6 TABLET ORAL at 22:01

## 2019-07-27 RX ADMIN — HEPARIN SODIUM 5000 UNIT(S): 5000 INJECTION INTRAVENOUS; SUBCUTANEOUS at 22:01

## 2019-07-27 RX ADMIN — SODIUM CHLORIDE 75 MILLILITER(S): 9 INJECTION, SOLUTION INTRAVENOUS at 06:46

## 2019-07-27 NOTE — H&P ADULT - NSHPPHYSICALEXAM_GEN_ALL_CORE
Vital Signs Last 24 Hrs  T(C): 36.8 (27 Jul 2019 02:23), Max: 37 (26 Jul 2019 23:23)  T(F): 98.3 (27 Jul 2019 02:23), Max: 98.6 (26 Jul 2019 23:23)  HR: 62 (27 Jul 2019 02:23) (62 - 92)  BP: 96/50 (27 Jul 2019 02:23) (83/50 - 108/59)  BP(mean): 71 (27 Jul 2019 02:23) (71 - 87)  RR: 20 (27 Jul 2019 02:23) (16 - 20)  SpO2: 95% (27 Jul 2019 02:23) (95% - 99%)    GENERAL:  no signs of respiratory distress, cough is present. no pain.  HEAD:  Atraumatic, Normocephalic  EYES: EOMI, PERRLA   NECK: Supple, No JVD  CHEST/LUNG: +expiratory wheezes   HEART: Regular rate and rhythm; No murmurs;   ABDOMEN: Soft, Nontender, Nondistended; Bowel sounds present; No guarding  EXTREMITIES:  no edema  PSYCH: oriented to place but not to time, oriented to self and president.  NEUROLOGY: + myoclonus, no focal motor deficit   SKIN: No rashes or lesions Vital Signs Last 24 Hrs  T(C): 36.8 (27 Jul 2019 02:23), Max: 37 (26 Jul 2019 23:23)  T(F): 98.3 (27 Jul 2019 02:23), Max: 98.6 (26 Jul 2019 23:23)  HR: 62 (27 Jul 2019 02:23) (62 - 92)  BP: 96/50 (27 Jul 2019 02:23) (83/50 - 108/59)  BP(mean): 71 (27 Jul 2019 02:23) (71 - 87)  RR: 20 (27 Jul 2019 02:23) (16 - 20)  SpO2: 95% (27 Jul 2019 02:23) (95% - 99%)    GENERAL:  no signs of respiratory distress, cough is present. no pain.  HEAD:  Atraumatic, Normocephalic  EYES: EOMI, PERRLA   NECK: Supple, No JVD  CHEST/LUNG: +expiratory wheezes   HEART: Regular rate and rhythm; No murmurs;   ABDOMEN: Soft, Nontender, Nondistended; Bowel sounds present; No guarding  EXTREMITIES:  no edema  PSYCH: oriented to place but not to time, oriented to self and president.  NEUROLOGY: + myoclonus, no focal motor deficit   SKIN: No rashes or lesions, r leg cast

## 2019-07-27 NOTE — H&P ADULT - NSHPLABSRESULTS_GEN_ALL_CORE
Labs:                        11.3   8.70  )-----------( 218      ( 2019 19:39 )             33.9                 133<L>  |  93<L>  |  73<HH>  ----------------------------<  86  4.9   |  20  |  7.1<HH>    Ca    8.2<L>      2019 23:30    TPro  7.7  /  Alb  4.3  /  TBili  0.3  /  DBili  x   /  AST  20  /  ALT  25  /  AlkPhos  87      LIVER FUNCTIONS - ( 2019 19:39 )  Alb: 4.3 g/dL / Pro: 7.7 g/dL / ALK PHOS: 87 U/L / ALT: 25 U/L / AST: 20 U/L / GGT: x                 PT/INR - ( 2019 19:39 )   PT: 12.50 sec;   INR: 1.09 ratio         PTT - ( 2019 19:39 )  PTT:30.8 sec  CARDIAC MARKERS ( 2019 01:02 )  x     / <0.01 ng/mL / 477 U/L / x     / 7.1 ng/mL  CARDIAC MARKERS ( 2019 19:39 )  x     / 0.01 ng/mL / 574 U/L / x     / 8.2 ng/mL    Urinalysis Basic - ( 2019 01:30 )    Color: Light Yellow / Appearance: Clear / S.021 / pH: x  Gluc: x / Ketone: Negative  / Bili: Negative / Urobili: <2 mg/dL   Blood: x / Protein: Trace / Nitrite: Negative   Leuk Esterase: Negative / RBC: x / WBC x   Sq Epi: x / Non Sq Epi: x / Bacteria: x    Imaging:  < from: CT Brain Stroke Protocol (19 @ 20:32) >  IMPRESSION:  No CT evidence of acute intracranial pathology  < end of copied text >    < from: CT Perfusion w/ Maps w/ IV Cont (19 @ 20:40) >    Impression:  Unremarkable CTA of the neck and brain; no focal stenosis or aneurysm identified at this time.  No decrease in cerebral blood flow or increased time to peak to suggest core infarct or ischemic penumbra.******PRELIMINARY REPORT******    < end of copied text >

## 2019-07-27 NOTE — SWALLOW BEDSIDE ASSESSMENT ADULT - SLP PERTINENT HISTORY OF CURRENT PROBLEM
Adm 2' confusion, tremors, slurred speech, fall 2 days ago w/ R ankle fx.  CTH: No acute pathology.  Dx: MILAN, encephalopathy

## 2019-07-27 NOTE — H&P ADULT - NSICDXFAMHXPERTINENTNEGATIVE_GEN_A_CORE_FT
unable to obtain at this point  family at the bedside denied any cardiovascular disease or malignancy

## 2019-07-27 NOTE — CONSULT NOTE ADULT - ASSESSMENT
MILAN/ change in mental status/ hypotension:  # ? prerenal inn nature  # non oliguric  # change iv fluids to 1/s ns with 75 meq of bicarbonate at 75 cc /h  # remains on pressor  # check osmolar gap  # check renal ultrasound  # ph noted, no binders  # neurology f/u  # no acute indication for RRT  # will follow

## 2019-07-27 NOTE — H&P ADULT - HISTORY OF PRESENT ILLNESS
a 58 year old ender with history of HTN, DLD and chronic back pain on methadone presented to ED for confusion. he called his daughter at around 4 pm telling her that he feels confused, she went there and noticed that he had tremors, could not hold anything in his right hand (everything is dropping), had slurred speech, and could not remember simple daily things he used to.   History is also relevant for a fall 2 days ago that was unwitnessed and was complicated by right ankle fracture currently in a cast.  he denies taking extra pill of his medications, he denies dizziness, diarrhea, vomiting, SOB, chest pain, palpitations, abdominal pain, decrease po intake, fever, chills.  he is on ibuprofen 800mg bid but family did not report extra use.    in the ED ZY=161/59 then dropped to 70s, HR=77, T=97.7, sat=98% on RA and RR=22  his cr was 7.7 form baseline of 1.5  AG=18 lac=0.7  s/p IV fluids (1-2L), s/p left femoral triple lumen, currently on levophed. a 58 year old ender with history of HTN, DLD and chronic back pain on methadone presented to ED for confusion. he called his daughter at around 4 pm telling her that he feels confused, she went there and noticed that he had tremors, could not hold anything in his right hand (everything is dropping), had slurred speech, and could not remember simple daily things he used to.   History is also relevant for a fall 2 days ago that was unwitnessed and was complicated by right ankle fracture currently in a cast.  he denies taking extra pill of his medications, he denies dizziness, diarrhea, vomiting, SOB, chest pain, palpitations, abdominal pain, decrease po intake, fever, chills.  he is on ibuprofen 800mg bid but family did not report extra use.    in the ED NZ=815/59 then dropped to 70s, HR=77, T=97.7, sat=98% on RA and RR=22  his cr was 7.7 form baseline of 1.5  AG=18 lac=0.7  s/p IV fluids (1-2L), s/p left femoral triple lumen, currently on levophed. stroke code called, CT HEAD/ NECK NEG, admitted for icu, still on levophed and IVF, no abx. found to be acute renal failure

## 2019-07-27 NOTE — H&P ADULT - NSHPREVIEWOFSYSTEMS_GEN_ALL_CORE
CONSTITUTIONAL: + weakness, fevers or chills  EYES/ENT: No visual changes;  No vertigo or throat pain   NECK: No pain or stiffness  RESPIRATORY: No cough, wheezing, hemoptysis; No shortness of breath  CARDIOVASCULAR: No chest pain or palpitations  GASTROINTESTINAL: No abdominal or epigastric pain. No nausea, vomiting, or hematemesis; No diarrhea or constipation. No melena or hematochezia.  GENITOURINARY: No dysuria, frequency or hematuria  NEUROLOGICAL: see HPI  SKIN: No itching, rashes

## 2019-07-27 NOTE — CONSULT NOTE ADULT - SUBJECTIVE AND OBJECTIVE BOX
PAST HISTORY  --------------------------------------------------------------------------------  PAST MEDICAL & SURGICAL HISTORY:  Ankle fracture: left nadiael  Fx x 2 with surgical repair  High cholesterol  HTN (hypertension)  Diabetes mellitus  History of ankle surgery        ALLERGIES & MEDICATIONS  --------------------------------------------------------------------------------  Allergies    No Known Allergies          Standing Inpatient Medications  ALBUTerol/ipratropium for Nebulization 3 milliLiter(s) Nebulizer every 4 hours  docusate sodium 100 milliGRAM(s) Oral three times a day  gabapentin 400 milliGRAM(s) Oral at bedtime  heparin  Injectable 5000 Unit(s) SubCutaneous every 8 hours  lactated ringers. 1000 milliLiter(s) IV Continuous <Continuous>  methadone    Tablet 80 milliGRAM(s) Oral daily  norepinephrine Infusion 0.05 MICROgram(s)/kG/Min IV Continuous <Continuous>  senna 2 Tablet(s) Oral at bedtime  simvastatin 20 milliGRAM(s) Oral at bedtime    PRN Inpatient Medications  cyclobenzaprine 10 milliGRAM(s) Oral at bedtime PRN        VITALS/PHYSICAL EXAM  --------------------------------------------------------------------------------  T(C): 36.5 (07-27-19 @ 07:20), Max: 37 (07-26-19 @ 23:23)  HR: 54 (07-27-19 @ 08:49) (48 - 92)  BP: 96/53 (07-27-19 @ 08:49) (83/50 - 116/56)  RR: 18 (07-27-19 @ 08:09) (16 - 20)  SpO2: 100% (07-27-19 @ 08:09) (95% - 100%)  Wt(kg): --  Height (cm): 157.48 (07-26-19 @ 19:50)  Weight (kg): 109.8 (07-26-19 @ 20:26)  BMI (kg/m2): 44.3 (07-26-19 @ 20:26)  BSA (m2): 2.07 (07-26-19 @ 20:26)      07-26-19 @ 07:01  -  07-27-19 @ 07:00  --------------------------------------------------------  IN: 2036.8 mL / OUT: 2250 mL / NET: -213.2 mL    07-27-19 @ 07:01  -  07-27-19 @ 08:56  --------------------------------------------------------  IN: 81.1 mL / OUT: 700 mL / NET: -618.9 mL      Physical Exam:  	Gen: NAD  	Pulm: decrease BS  B/L  	CV:  S1S2; no rub  	Abd: distended  	: cornelius   	LE:  no edema    LABS/STUDIES  --------------------------------------------------------------------------------              10.3   8.28  >-----------<  239      [07-27-19 @ 06:45]              31.1     134  |  97  |  68  ----------------------------<  105      [07-27-19 @ 06:45]  4.8   |  18  |  5.7        Ca     8.8     [07-27-19 @ 06:45]      Mg     2.4     [07-27-19 @ 06:45]      Phos  5.2     [07-27-19 @ 06:45]    TPro  7.7  /  Alb  4.3  /  TBili  0.3  /  DBili  x   /  AST  20  /  ALT  25  /  AlkPhos  87  [07-26-19 @ 19:39]    PT/INR: PT 12.50, INR 1.09       [07-26-19 @ 19:39]  PTT: 30.8       [07-26-19 @ 19:39]    Troponin <0.01      [07-27-19 @ 01:02]        [07-27-19 @ 01:02]    Creatinine Trend:  SCr 5.7 [07-27 @ 06:45]  SCr 7.1 [07-26 @ 23:30]  SCr 7.7 [07-26 @ 19:39]    Urinalysis - [07-27-19 @ 01:30]      Color Light Yellow / Appearance Clear / SG 1.021 / pH 6.0      Gluc Negative / Ketone Negative  / Bili Negative / Urobili <2 mg/dL       Blood Trace / Protein Trace / Leuk Est Negative / Nitrite Negative      RBC  / WBC  / Hyaline  / Gran  / Sq Epi  / Non Sq Epi  / Bacteria       HbA1c 6.0      [11-09-18 @ 11:44]  Lipid: chol 236, , HDL 52,       [11-09-18 @ 11:44]    HBsAb Nonreact      [11-09-18 @ 11:44]  HBsAg Nonreact      [11-09-18 @ 11:44]  HBcAb Nonreact      [11-09-18 @ 11:44]  HCV 0.18, Nonreact      [11-09-18 @ 11:44]    Syphilis Screen (Treponema Pallidum Ab) Negative      [11-09-18 @ 11:44] 58 year old man, [facility] as below  presented to ED for confusion.  in the ED, patient was hypotensive, started on levophed  ? NSAIDS intake at home  called for MILAN     PAST HISTORY  --------------------------------------------------------------------------------  PAST MEDICAL & SURGICAL HISTORY:  Ankle fracture: left ankel  Fx x 2 with surgical repair  High cholesterol  HTN (hypertension)  Diabetes mellitus  History of ankle surgery        ALLERGIES & MEDICATIONS  --------------------------------------------------------------------------------  Allergies    No Known Allergies          Standing Inpatient Medications  ALBUTerol/ipratropium for Nebulization 3 milliLiter(s) Nebulizer every 4 hours  docusate sodium 100 milliGRAM(s) Oral three times a day  gabapentin 400 milliGRAM(s) Oral at bedtime  heparin  Injectable 5000 Unit(s) SubCutaneous every 8 hours  lactated ringers. 1000 milliLiter(s) IV Continuous <Continuous>  methadone    Tablet 80 milliGRAM(s) Oral daily  norepinephrine Infusion 0.05 MICROgram(s)/kG/Min IV Continuous <Continuous>  senna 2 Tablet(s) Oral at bedtime  simvastatin 20 milliGRAM(s) Oral at bedtime    PRN Inpatient Medications  cyclobenzaprine 10 milliGRAM(s) Oral at bedtime PRN        VITALS/PHYSICAL EXAM  --------------------------------------------------------------------------------  T(C): 36.5 (07-27-19 @ 07:20), Max: 37 (07-26-19 @ 23:23)  HR: 54 (07-27-19 @ 08:49) (48 - 92)  BP: 96/53 (07-27-19 @ 08:49) (83/50 - 116/56)  RR: 18 (07-27-19 @ 08:09) (16 - 20)  SpO2: 100% (07-27-19 @ 08:09) (95% - 100%)  Wt(kg): --  Height (cm): 157.48 (07-26-19 @ 19:50)  Weight (kg): 109.8 (07-26-19 @ 20:26)  BMI (kg/m2): 44.3 (07-26-19 @ 20:26)  BSA (m2): 2.07 (07-26-19 @ 20:26)      07-26-19 @ 07:01  -  07-27-19 @ 07:00  --------------------------------------------------------  IN: 2036.8 mL / OUT: 2250 mL / NET: -213.2 mL    07-27-19 @ 07:01  -  07-27-19 @ 08:56  --------------------------------------------------------  IN: 81.1 mL / OUT: 700 mL / NET: -618.9 mL      Physical Exam:  	Gen: NAD  	Pulm: decrease BS  B/L  	CV:  S1S2; no rub  	Abd: distended  	: cornelius   	LE:  no edema    LABS/STUDIES  --------------------------------------------------------------------------------              10.3   8.28  >-----------<  239      [07-27-19 @ 06:45]              31.1     134  |  97  |  68  ----------------------------<  105      [07-27-19 @ 06:45]  4.8   |  18  |  5.7        Ca     8.8     [07-27-19 @ 06:45]      Mg     2.4     [07-27-19 @ 06:45]      Phos  5.2     [07-27-19 @ 06:45]    TPro  7.7  /  Alb  4.3  /  TBili  0.3  /  DBili  x   /  AST  20  /  ALT  25  /  AlkPhos  87  [07-26-19 @ 19:39]    PT/INR: PT 12.50, INR 1.09       [07-26-19 @ 19:39]  PTT: 30.8       [07-26-19 @ 19:39]    Troponin <0.01      [07-27-19 @ 01:02]        [07-27-19 @ 01:02]    Creatinine Trend:  SCr 5.7 [07-27 @ 06:45]  SCr 7.1 [07-26 @ 23:30]  SCr 7.7 [07-26 @ 19:39]    Urinalysis - [07-27-19 @ 01:30]      Color Light Yellow / Appearance Clear / SG 1.021 / pH 6.0      Gluc Negative / Ketone Negative  / Bili Negative / Urobili <2 mg/dL       Blood Trace / Protein Trace / Leuk Est Negative / Nitrite Negative      RBC  / WBC  / Hyaline  / Gran  / Sq Epi  / Non Sq Epi  / Bacteria       HbA1c 6.0      [11-09-18 @ 11:44]  Lipid: chol 236, , HDL 52,       [11-09-18 @ 11:44]    HBsAb Nonreact      [11-09-18 @ 11:44]  HBsAg Nonreact      [11-09-18 @ 11:44]  HBcAb Nonreact      [11-09-18 @ 11:44]  HCV 0.18, Nonreact      [11-09-18 @ 11:44]    Syphilis Screen (Treponema Pallidum Ab) Negative      [11-09-18 @ 11:44]  < from: CT Brain Stroke Protocol (07.26.19 @ 20:32) >  No CT evidence of acute intracranial pathology

## 2019-07-27 NOTE — H&P ADULT - ASSESSMENT
a 58 year old ender with history of HTN, DLD and chronic back pain on methadone presented to ED for confusion.    *Metabolic encephalopathy in the setting of MILAN  -r/o uremia, medications toxicity (methadone and gabapentin), SZ  -CT head noted no acute intracranial pathology  -EEG, urine and serum drug screen  -nephrology evaluation for uremia  -ammonia normal    *Hypotension unclear etiology  -IV hydration, CK noted  -levophed target MAP 65, lactate within normal   -UA negative, urine and blood culture sent to r/o   -will check TSH and cortisol    *MILAN   -cr 7.7 from 1.2  -r/o prerenal, ATN from medications (NSAIDs), post renal (unclear if he was retaining)  -hold ace inh, NSAIDs and diuretics  -check Fena and Feurea  -US kidney and bladder  -MAP target 65    *HAGMA  -AG=20, lactate normal  -delta delta=2 --> associated with metabolic alkalosis --> will hydrate, the HAGMA most likely due to MILAN    *DLD c/w simvastatin  *Chronic pain PLEASE VERIFY METHADONE DOSE IN AM, he uses [facility] methadone clinic     *DVT ppx: heparin s/c  *NPO for now  *ICU monitoring a 58 year old ender with history of HTN, DLD and chronic back pain on methadone presented to ED for confusion.    *Metabolic encephalopathy in the setting of MILAN  -r/o uremia, medications toxicity (methadone and gabapentin), SZ  -CT head noted no acute intracranial pathology  -EEG, urine and serum drug screen  -nephrology evaluation for uremia  -ammonia normal  -neurocheck every 2 hours  -check folate, TSH and vitamin b12  -neurology evaluation    *Hypotension unclear etiology  -IV hydration, CK noted  -levophed target MAP 65, lactate within normal   -UA negative, urine and blood culture sent to r/o   -will check TSH and cortisol    *MILAN   -cr 7.7 from 1.2  -r/o prerenal, ATN from medications (NSAIDs), post renal (unclear if he was retaining)  -hold ace inh, NSAIDs and diuretics  -check Fena and Feurea  -US kidney and bladder  -MAP target 65    *HAGMA  -AG=20, lactate normal  -delta delta=2 --> associated with metabolic alkalosis --> will hydrate, the HAGMA most likely due to MILAN    *DLD c/w simvastatin  *Chronic pain PLEASE VERIFY METHADONE DOSE IN AM, he uses [facility] methadone clinic     *DVT ppx: heparin s/c  *NPO for now  *ICU monitoring a 58 year old ender with history of HTN, DLD and chronic back pain on methadone presented to ED for confusion/ was hypotensive/ s/p IVF/ on low dose levophed/ no source of infection/ no fever/ no WBC/ no neck stffness/ however Acute renal failure/ on NSAIDS/ gabapentin, doubt PE	    *Metabolic encephalopathy in the setting of MILAN improved  -renal failure, medications toxicity (methadone and gabapentin), SZ  -CT head noted no acute intracranial pathology, repeat head CT  -EEG, urine and serum drug screen  -nephrology evaluation for uremia  -ammonia normal  -neurocheck every 2 hours  -check folate, TSH and vitamin b12  -neurology evaluation    *Hypotension unclear etiology. meds toxicity  - IVF bolus  -IV hydration, CK noted trend  -levophed target MAP 65, lactate within normal , taper and dc TLC when off levophed  -UA negative, urine and blood culture sent to r/o   -will check TSH and cortisol  ECHO    *MILAN   -cr 7.7 from 1.2 good UO after shields  -r/o prerenal, ATN from medications (NSAIDs), post renal (unclear if he was retaining)  -hold ace inh, NSAIDs and diuretics  -check Fena and Feurea  -US kidney and bladder  -MAP target 65    *HAGMA  -AG=20, lactate normal  -delta delta=2 --> associated with metabolic alkalosis --> will hydrate    *DLD c/w simvastatin  *Chronic pain, verify methadone dose    F/UP CT A.P result    *DVT ppx: heparin s/c, le doppler    *ICU monitoring for now

## 2019-07-27 NOTE — H&P ADULT - NSICDXPASTMEDICALHX_GEN_ALL_CORE_FT
PAST MEDICAL HISTORY:  Ankle fracture left ankel  Fx x 2 with surgical repair    Diabetes mellitus     High cholesterol     HTN (hypertension)

## 2019-07-27 NOTE — SWALLOW BEDSIDE ASSESSMENT ADULT - ASR SWALLOW ASPIRATION MONITOR
cough/fever/throat clearing/gurgly voice/pneumonia/upper respiratory infection/change of breathing pattern

## 2019-07-28 LAB
ANION GAP SERPL CALC-SCNC: 9 MMOL/L — SIGNIFICANT CHANGE UP (ref 7–14)
APPEARANCE UR: CLEAR — SIGNIFICANT CHANGE UP
BACTERIA # UR AUTO: NEGATIVE — SIGNIFICANT CHANGE UP
BASOPHILS # BLD AUTO: 0.03 K/UL — SIGNIFICANT CHANGE UP (ref 0–0.2)
BASOPHILS NFR BLD AUTO: 0.6 % — SIGNIFICANT CHANGE UP (ref 0–1)
BILIRUB UR-MCNC: NEGATIVE — SIGNIFICANT CHANGE UP
BUN SERPL-MCNC: 45 MG/DL — HIGH (ref 10–20)
CALCIUM SERPL-MCNC: 8.8 MG/DL — SIGNIFICANT CHANGE UP (ref 8.5–10.1)
CHLORIDE SERPL-SCNC: 101 MMOL/L — SIGNIFICANT CHANGE UP (ref 98–110)
CO2 SERPL-SCNC: 29 MMOL/L — SIGNIFICANT CHANGE UP (ref 17–32)
COLOR SPEC: SIGNIFICANT CHANGE UP
CREAT SERPL-MCNC: 2.1 MG/DL — HIGH (ref 0.7–1.5)
CULTURE RESULTS: NO GROWTH — SIGNIFICANT CHANGE UP
DIFF PNL FLD: NEGATIVE — SIGNIFICANT CHANGE UP
EOSINOPHIL # BLD AUTO: 0.15 K/UL — SIGNIFICANT CHANGE UP (ref 0–0.7)
EOSINOPHIL NFR BLD AUTO: 2.9 % — SIGNIFICANT CHANGE UP (ref 0–8)
EPI CELLS # UR: 1 /HPF — SIGNIFICANT CHANGE UP (ref 0–5)
FOLATE SERPL-MCNC: 7.7 NG/ML — SIGNIFICANT CHANGE UP
GLUCOSE BLDC GLUCOMTR-MCNC: 105 MG/DL — HIGH (ref 70–99)
GLUCOSE BLDC GLUCOMTR-MCNC: 117 MG/DL — HIGH (ref 70–99)
GLUCOSE SERPL-MCNC: 121 MG/DL — HIGH (ref 70–99)
GLUCOSE UR QL: NEGATIVE — SIGNIFICANT CHANGE UP
HCT VFR BLD CALC: 29.6 % — LOW (ref 42–52)
HCV AB S/CO SERPL IA: 0.12 S/CO — SIGNIFICANT CHANGE UP (ref 0–0.99)
HCV AB SERPL-IMP: SIGNIFICANT CHANGE UP
HGB BLD-MCNC: 9.6 G/DL — LOW (ref 14–18)
HYALINE CASTS # UR AUTO: NEGATIVE /LPF — SIGNIFICANT CHANGE UP (ref 0–7)
IMM GRANULOCYTES NFR BLD AUTO: 0.2 % — SIGNIFICANT CHANGE UP (ref 0.1–0.3)
KETONES UR-MCNC: NEGATIVE — SIGNIFICANT CHANGE UP
LEUKOCYTE ESTERASE UR-ACNC: ABNORMAL
LYMPHOCYTES # BLD AUTO: 1.11 K/UL — LOW (ref 1.2–3.4)
LYMPHOCYTES # BLD AUTO: 21.5 % — SIGNIFICANT CHANGE UP (ref 20.5–51.1)
MAGNESIUM SERPL-MCNC: 1.9 MG/DL — SIGNIFICANT CHANGE UP (ref 1.8–2.4)
MCHC RBC-ENTMCNC: 29.8 PG — SIGNIFICANT CHANGE UP (ref 27–31)
MCHC RBC-ENTMCNC: 32.4 G/DL — SIGNIFICANT CHANGE UP (ref 32–37)
MCV RBC AUTO: 91.9 FL — SIGNIFICANT CHANGE UP (ref 80–94)
MONOCYTES # BLD AUTO: 0.47 K/UL — SIGNIFICANT CHANGE UP (ref 0.1–0.6)
MONOCYTES NFR BLD AUTO: 9.1 % — SIGNIFICANT CHANGE UP (ref 1.7–9.3)
NEUTROPHILS # BLD AUTO: 3.4 K/UL — SIGNIFICANT CHANGE UP (ref 1.4–6.5)
NEUTROPHILS NFR BLD AUTO: 65.7 % — SIGNIFICANT CHANGE UP (ref 42.2–75.2)
NITRITE UR-MCNC: NEGATIVE — SIGNIFICANT CHANGE UP
NRBC # BLD: 0 /100 WBCS — SIGNIFICANT CHANGE UP (ref 0–0)
OSMOLALITY SERPL: 316 MOSMOL/KG — HIGH (ref 275–300)
PH UR: 6.5 — SIGNIFICANT CHANGE UP (ref 5–8)
PHOSPHATE SERPL-MCNC: 3.7 MG/DL — SIGNIFICANT CHANGE UP (ref 2.1–4.9)
PLATELET # BLD AUTO: 226 K/UL — SIGNIFICANT CHANGE UP (ref 130–400)
POTASSIUM SERPL-MCNC: 4.4 MMOL/L — SIGNIFICANT CHANGE UP (ref 3.5–5)
POTASSIUM SERPL-SCNC: 4.4 MMOL/L — SIGNIFICANT CHANGE UP (ref 3.5–5)
PROT UR-MCNC: SIGNIFICANT CHANGE UP
RBC # BLD: 3.22 M/UL — LOW (ref 4.7–6.1)
RBC # FLD: 12.6 % — SIGNIFICANT CHANGE UP (ref 11.5–14.5)
RBC CASTS # UR COMP ASSIST: 8 /HPF — HIGH (ref 0–4)
SODIUM SERPL-SCNC: 139 MMOL/L — SIGNIFICANT CHANGE UP (ref 135–146)
SP GR SPEC: 1.02 — SIGNIFICANT CHANGE UP (ref 1.01–1.02)
SPECIMEN SOURCE: SIGNIFICANT CHANGE UP
UROBILINOGEN FLD QL: SIGNIFICANT CHANGE UP
VIT B12 SERPL-MCNC: 627 PG/ML — SIGNIFICANT CHANGE UP (ref 232–1245)
WBC # BLD: 5.17 K/UL — SIGNIFICANT CHANGE UP (ref 4.8–10.8)
WBC # FLD AUTO: 5.17 K/UL — SIGNIFICANT CHANGE UP (ref 4.8–10.8)
WBC UR QL: 4 /HPF — SIGNIFICANT CHANGE UP (ref 0–5)

## 2019-07-28 PROCEDURE — 99232 SBSQ HOSP IP/OBS MODERATE 35: CPT

## 2019-07-28 PROCEDURE — 71045 X-RAY EXAM CHEST 1 VIEW: CPT | Mod: 26

## 2019-07-28 PROCEDURE — 76770 US EXAM ABDO BACK WALL COMP: CPT | Mod: 26

## 2019-07-28 PROCEDURE — 93306 TTE W/DOPPLER COMPLETE: CPT | Mod: 26

## 2019-07-28 RX ADMIN — SENNA PLUS 2 TABLET(S): 8.6 TABLET ORAL at 21:33

## 2019-07-28 RX ADMIN — METHADONE HYDROCHLORIDE 80 MILLIGRAM(S): 40 TABLET ORAL at 12:15

## 2019-07-28 RX ADMIN — HEPARIN SODIUM 5000 UNIT(S): 5000 INJECTION INTRAVENOUS; SUBCUTANEOUS at 14:32

## 2019-07-28 RX ADMIN — SIMVASTATIN 20 MILLIGRAM(S): 20 TABLET, FILM COATED ORAL at 21:33

## 2019-07-28 RX ADMIN — HEPARIN SODIUM 5000 UNIT(S): 5000 INJECTION INTRAVENOUS; SUBCUTANEOUS at 06:45

## 2019-07-28 RX ADMIN — Medication 100 MILLIGRAM(S): at 06:45

## 2019-07-28 RX ADMIN — Medication 100 MILLIGRAM(S): at 14:32

## 2019-07-28 RX ADMIN — HEPARIN SODIUM 5000 UNIT(S): 5000 INJECTION INTRAVENOUS; SUBCUTANEOUS at 21:33

## 2019-07-28 RX ADMIN — Medication 100 MILLIGRAM(S): at 21:33

## 2019-07-28 NOTE — PROGRESS NOTE ADULT - SUBJECTIVE AND OBJECTIVE BOX
Neurology Follow-Up:    Pt seen at bedside w/ son present. Feeling alittle better, more coherent.    Vital Signs Last 24 Hrs  T(C): 36.8 (27 Jul 2019 20:00), Max: 37 (27 Jul 2019 04:27)  T(F): 98.2 (27 Jul 2019 20:00), Max: 98.6 (27 Jul 2019 04:27)  HR: 52 (28 Jul 2019 00:00) (48 - 63)  BP: 110/50 (28 Jul 2019 00:00) (92/48 - 116/56)  BP(mean): 70 (28 Jul 2019 00:00) (57 - 81)  RR: 23 (28 Jul 2019 00:00) (16 - 25)  SpO2: 97% (28 Jul 2019 00:00) (95% - 100%)    Neuro Exam:  Orientation: oriented to person, oriented to place and oriented to time.   Attention: normal concentrating ability and visual attention was not decreased.   Language: no difficulty naming common objects, no difficulty repeating a phrase  Cranial Nerves: visual acuity intact bilaterally, visual fields full to confrontation, pupils equal round and reactive to light, extraocular motion intact, facial sensation intact symmetrically, face symmetrical, hearing was intact bilaterally, tongue and palate midline, head turning and shoulder shrug symmetric and there was no tongue deviation with protrusion.   Motor: muscle tone was normal in all four extremities, muscle strength was normal in all four extremities and normal bulk in all four extremities.   Sensory exam: light touch was intact.   + Asterixis  Deep tendon reflexes:   Biceps right 2+. Biceps left 2+.    Triceps right 2+. Triceps left 2+.  LOC  Brachioradialis right 2+. Brachioradialis left 2+.    Patella right 2+. Patella left 2+.    Ankle jerk right 2+. Ankle jerk left 2+.   normal plantars b/l    Allergies    No Known Allergies    Intolerances      MEDICATIONS  (STANDING):  ALBUTerol/ipratropium for Nebulization 3 milliLiter(s) Nebulizer every 4 hours  docusate sodium 100 milliGRAM(s) Oral three times a day  gabapentin 400 milliGRAM(s) Oral at bedtime  heparin  Injectable 5000 Unit(s) SubCutaneous every 8 hours  methadone    Tablet 80 milliGRAM(s) Oral daily  methadone    Tablet 80 milliGRAM(s) Oral daily  norepinephrine Infusion 0.05 MICROgram(s)/kG/Min (10.294 mL/Hr) IV Continuous <Continuous>  senna 2 Tablet(s) Oral at bedtime  simvastatin 20 milliGRAM(s) Oral at bedtime  sodium chloride 0.45% 1000 milliLiter(s) (75 mL/Hr) IV Continuous <Continuous>    MEDICATIONS  (PRN):  acetaminophen   Tablet .. 650 milliGRAM(s) Oral every 6 hours PRN Moderate Pain (4 - 6)  cyclobenzaprine 10 milliGRAM(s) Oral at bedtime PRN Muscle Spasm      LABS:                        10.3   8.28  )-----------( 239      ( 27 Jul 2019 06:45 )             31.1     07-27    136  |  100  |  55<H>  ----------------------------<  83  4.2   |  23  |  3.5<H>    Ca    8.6      27 Jul 2019 16:55  Phos  5.2     07-27  Mg     2.4     07-27    TPro  7.7  /  Alb  4.3  /  TBili  0.3  /  DBili  x   /  AST  20  /  ALT  25  /  AlkPhos  87  07-26    PT/INR - ( 26 Jul 2019 19:39 )   PT: 12.50 sec;   INR: 1.09 ratio         PTT - ( 26 Jul 2019 19:39 )  PTT:30.8 sec    Ammonia, Serum: 41 umol/L (07.27.19 @ 01:02)    Thyroid Stimulating Hormone, Serum: 2.28 uIU/mL (07.27.19 @ 01:02)      < from: CT Perfusion w/ Maps w/ IV Cont (07.26.19 @ 20:40) >    Vascular lesions in the retro-orbital regions 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    No significant vascular stenosis.    No decrease in cerebral blood flow or increased time to peak to suggest   core infarct or ischemicpenumbra.    < end of copied text >    < from: CT Brain Stroke Protocol (07.26.19 @ 20:32) >    IMPRESSION:    No CT evidence of acute intracranial pathology    < end of copied text >    < from: EEG (07.27.19 @ 09:30) >  IMPRESSIONS  This is a borderline to mildly abnormal EEG due to the presence of   borderline to mild generalized slowing    < end of copied text > Neurology Follow-Up:    Pt seen at bedside w/ son present. Feeling alittle better, more coherent.  still having significant symptomatic asterixis.    Vital Signs Last 24 Hrs  T(C): 36.8 (27 Jul 2019 20:00), Max: 37 (27 Jul 2019 04:27)  T(F): 98.2 (27 Jul 2019 20:00), Max: 98.6 (27 Jul 2019 04:27)  HR: 52 (28 Jul 2019 00:00) (48 - 63)  BP: 110/50 (28 Jul 2019 00:00) (92/48 - 116/56)  BP(mean): 70 (28 Jul 2019 00:00) (57 - 81)  RR: 23 (28 Jul 2019 00:00) (16 - 25)  SpO2: 97% (28 Jul 2019 00:00) (95% - 100%)    Neuro Exam:  Orientation: oriented to person, oriented to place and oriented to time.   Attention: normal concentrating ability and visual attention was not decreased.   Language: no difficulty naming common objects, no difficulty repeating a phrase  Cranial Nerves: visual acuity intact bilaterally, visual fields full to confrontation, pupils equal round and reactive to light, extraocular motion intact, facial sensation intact symmetrically, face symmetrical, hearing was intact bilaterally, tongue and palate midline, head turning and shoulder shrug symmetric and there was no tongue deviation with protrusion.   Motor: muscle tone was normal in all four extremities, muscle strength was normal in all four extremities and normal bulk in all four extremities.   Sensory exam: light touch was intact.   + Asterixis  Deep tendon reflexes:   Biceps right 2+. Biceps left 2+.    Triceps right 2+. Triceps left 2+.  LOC  Brachioradialis right 2+. Brachioradialis left 2+.    Patella right 2+. Patella left 2+.    Ankle jerk right 2+. Ankle jerk left 2+.   normal plantars b/l    Allergies    No Known Allergies    Intolerances    MEDICATIONS  (STANDING):  ALBUTerol/ipratropium for Nebulization 3 milliLiter(s) Nebulizer every 4 hours  docusate sodium 100 milliGRAM(s) Oral three times a day  gabapentin 400 milliGRAM(s) Oral at bedtime  heparin  Injectable 5000 Unit(s) SubCutaneous every 8 hours  methadone    Tablet 80 milliGRAM(s) Oral daily  methadone    Tablet 80 milliGRAM(s) Oral daily  norepinephrine Infusion 0.05 MICROgram(s)/kG/Min (10.294 mL/Hr) IV Continuous <Continuous>  senna 2 Tablet(s) Oral at bedtime  simvastatin 20 milliGRAM(s) Oral at bedtime  sodium chloride 0.45% 1000 milliLiter(s) (75 mL/Hr) IV Continuous <Continuous>    MEDICATIONS  (PRN):  acetaminophen   Tablet .. 650 milliGRAM(s) Oral every 6 hours PRN Moderate Pain (4 - 6)  cyclobenzaprine 10 milliGRAM(s) Oral at bedtime PRN Muscle Spasm    LABS:                        10.3   8.28  )-----------( 239      ( 27 Jul 2019 06:45 )             31.1     07-27    136  |  100  |  55<H>  ----------------------------<  83  4.2   |  23  |  3.5<H>    Ca    8.6      27 Jul 2019 16:55  Phos  5.2     07-27  Mg     2.4     07-27    TPro  7.7  /  Alb  4.3  /  TBili  0.3  /  DBili  x   /  AST  20  /  ALT  25  /  AlkPhos  87  07-26    PT/INR - ( 26 Jul 2019 19:39 )   PT: 12.50 sec;   INR: 1.09 ratio         PTT - ( 26 Jul 2019 19:39 )  PTT:30.8 sec    Ammonia, Serum: 41 umol/L (07.27.19 @ 01:02)    Thyroid Stimulating Hormone, Serum: 2.28 uIU/mL (07.27.19 @ 01:02)      < from: CT Perfusion w/ Maps w/ IV Cont (07.26.19 @ 20:40) >    Vascular lesions in the retro-orbital regions 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    No significant vascular stenosis.    No decrease in cerebral blood flow or increased time to peak to suggest   core infarct or ischemicpenumbra.    < end of copied text >    < from: CT Brain Stroke Protocol (07.26.19 @ 20:32) >    IMPRESSION:    No CT evidence of acute intracranial pathology    < end of copied text >    < from: EEG (07.27.19 @ 09:30) >  IMPRESSIONS  This is a borderline to mildly abnormal EEG due to the presence of   borderline to mild generalized slowing    < end of copied text >

## 2019-07-28 NOTE — PROGRESS NOTE ADULT - ASSESSMENT
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      Plan:  - correct electrolytes  - nephrology following  - consider Klonopin 0.25 mg Q8hr ATC  - MRI Brain NC - may need Klonopin prior to control jerks   - BP management per CCU team

## 2019-07-28 NOTE — PROGRESS NOTE ADULT - ASSESSMENT
a 58 year old ender with history of HTN, DLD and chronic back pain on methadone presented to ED for confusion/ was hypotensive/ s/p IVF/ on low dose levophed/ no source of infection/ no fever/ no WBC/ no neck stffness/ however Acute renal failure/ on NSAIDS/ gabapentin, doubt PE, still on low dose levophed, TSH nl    *Metabolic encephalopathy in the setting of MILAN improved  -renal failure, medications toxicity (methadone and gabapentin), SZ  - dc bicarb drip  -ammonia normal  -neurocheck every 5 hours  -check folate, TSH and vitamin b12  -neurology f/up    *Hypotension . meds toxicity in setting of renal failure  - dc bicarb drip  -levophed target MAP 65, lactate within normal , taper and dc TLC when off levophed  -UA negative, urine and blood culture sent to r/o   -will check cortisol  ECHO  cardio eval    *MILAN   improving  -hold ace inh, NSAIDs and diuretics  -US kidney and bladder  -MAP target 65    f/up le doppler    *DLD c/w simvastatin  *Chronic pain, verify methadone dose    *DVT ppx: heparin s/c, le doppler  repeat cbc , cmp  icu monitor

## 2019-07-28 NOTE — PROGRESS NOTE ADULT - SUBJECTIVE AND OBJECTIVE BOX
LENGTH OF HOSPITAL STAY: 1d    CHIEF COMPLAINT:   Patient is a 58y old  Male who presents with a chief complaint of Confusion (2019 08:06)      HISTORY OF PRESENTING ILLNESS:    HPI:  a 58 year old ender with history of HTN, DLD and chronic back pain on methadone presented to ED for confusion. he called his daughter at around 4 pm telling her that he feels confused, she went there and noticed that he had tremors, could not hold anything in his right hand (everything is dropping), had slurred speech, and could not remember simple daily things he used to.   History is also relevant for a fall 2 days ago that was unwitnessed and was complicated by right ankle fracture currently in a cast.  he denies taking extra pill of his medications, he denies dizziness, diarrhea, vomiting, SOB, chest pain, palpitations, abdominal pain, decrease po intake, fever, chills.  he is on ibuprofen 800mg bid but family did not report extra use.    in the ED GK=755/59 then dropped to 70s, HR=77, T=97.7, sat=98% on RA and RR=22  his cr was 7.7 form baseline of 1.5  AG=18 lac=0.7  s/p IV fluids (1-2L), s/p left femoral triple lumen, currently on levophed. stroke code called, CT HEAD/ NECK NEG, admitted for icu, still on levophed and IVF, no abx. found to be acute renal failure (2019 02:42)    PAST MEDICAL & SURGICAL HISTORY  PAST MEDICAL & SURGICAL HISTORY:  Ankle fracture: left ankel  Fx x 2 with surgical repair  High cholesterol  HTN (hypertension)  Diabetes mellitus  History of ankle surgery    SOCIAL HISTORY:    ALLERGIES:  No Known Allergies    MEDICATIONS:  STANDING MEDICATIONS  ALBUTerol/ipratropium for Nebulization 3 milliLiter(s) Nebulizer every 4 hours  docusate sodium 100 milliGRAM(s) Oral three times a day  heparin  Injectable 5000 Unit(s) SubCutaneous every 8 hours  methadone    Tablet 80 milliGRAM(s) Oral daily  methadone    Tablet 80 milliGRAM(s) Oral daily  norepinephrine Infusion 0.05 MICROgram(s)/kG/Min IV Continuous <Continuous>  senna 2 Tablet(s) Oral at bedtime  simvastatin 20 milliGRAM(s) Oral at bedtime    PRN MEDICATIONS  acetaminophen   Tablet .. 650 milliGRAM(s) Oral every 6 hours PRN  cyclobenzaprine 10 milliGRAM(s) Oral at bedtime PRN    VITALS:   T(F): 97  HR: 58  BP: 117/57  RR: 12  SpO2: 99%    LABS:                        9.6    5.17  )-----------( 226      ( 2019 04:30 )             29.6         139  |  101  |  45<H>  ----------------------------<  121<H>  4.4   |  29  |  2.1<H>    Ca    8.8      2019 04:30  Phos  3.7       Mg     1.9         TPro  7.7  /  Alb  4.3  /  TBili  0.3  /  DBili  x   /  AST  20  /  ALT  25  /  AlkPhos  87      PT/INR - ( 2019 19:39 )   PT: 12.50 sec;   INR: 1.09 ratio         PTT - ( 2019 19:39 )  PTT:30.8 sec  Urinalysis Basic - ( 2019 01:30 )    Color: Light Yellow / Appearance: Clear / S.021 / pH: x  Gluc: x / Ketone: Negative  / Bili: Negative / Urobili: <2 mg/dL   Blood: x / Protein: Trace / Nitrite: Negative   Leuk Esterase: Negative / RBC: x / WBC x   Sq Epi: x / Non Sq Epi: x / Bacteria: x            Culture - Blood (collected 2019 01:02)  Source: .Blood Blood  Preliminary Report (2019 07:00):    No growth to date.      CARDIAC MARKERS ( 2019 01:02 )  x     / <0.01 ng/mL / 477 U/L / x     / 7.1 ng/mL  CARDIAC MARKERS ( 2019 19:39 )  x     / 0.01 ng/mL / 574 U/L / x     / 8.2 ng/mL      RADIOLOGY:  < from: US Kidney and Bladder (19 @ 07:37) >  IMPRESSION:  Unremarkable appearance of the right kidney. Nondiagnostic evaluation of   the left kidney and urinary bladder.    < end of copied text >    < from: CT Abdomen and Pelvis No Cont (19 @ 04:13) >  IMPRESSION: Distended stomach. No small or large bowel obstruction. No   intraperitoneal free air or fluid fluid.          < end of copied text >        PHYSICAL EXAM:  GEN: No acute distress  HEENT: At, NC , DARREL, nasal canula in place  LUNGS: Clear to auscultation bilaterally   HEART: S1/S2 present. RRR.   ABD: Soft, non-tender, non-distended. Bowel sounds present  EXT: plaster on Lt ankle, edema/cyanosis/ clubbing absent  NEURO: AAOX3

## 2019-07-28 NOTE — CONSULT NOTE ADULT - SUBJECTIVE AND OBJECTIVE BOX
HPI:  58 year old ender with history of HTN, DLD and chronic back pain on methadone presented to ED for confusion. he called his daughter at around 4 pm telling her that he feels confused, she went there and noticed that he had tremors, could not hold anything in his right hand (everything is dropping), had slurred speech, and could not remember simple daily things he used to.   History is also relevant for a fall 2 days ago that was unwitnessed and was complicated by right ankle fracture currently in a cast.  he denies taking extra pill of his medications, he denies dizziness, diarrhea, vomiting, SOB, chest pain, palpitations, abdominal pain, decrease po intake, fever, chills.  he is on ibuprofen 800mg bid but family did not report extra use.    in the ED PC=392/59 then dropped to 70s, HR=77, T=97.7, sat=98% on RA and RR=22  his cr was 7.7 form baseline of 1.5  AG=18 lac=0.7  s/p IV fluids (1-2L), s/p left femoral triple lumen, currently on levophed. stroke code called, CT HEAD/ NECK NEG, admitted for icu, still on levophed and IVF, no abx. found to be acute renal failure (27 Jul 2019 02:42)      PAST MEDICAL & SURGICAL HISTORY  Ankle fracture: left ankel  Fx x 2 with surgical repair  High cholesterol  HTN (hypertension)  Diabetes mellitus  History of ankle surgery      FAMILY HISTORY:  FAMILY HISTORY:  No pertinent family history in first degree relatives      SOCIAL HISTORY:  []smoker  []Alcohol  []Drug    ALLERGIES:  No Known Allergies      MEDICATIONS:  MEDICATIONS  (STANDING):  ALBUTerol/ipratropium for Nebulization 3 milliLiter(s) Nebulizer every 4 hours  docusate sodium 100 milliGRAM(s) Oral three times a day  heparin  Injectable 5000 Unit(s) SubCutaneous every 8 hours  methadone    Tablet 80 milliGRAM(s) Oral daily  methadone    Tablet 80 milliGRAM(s) Oral daily  norepinephrine Infusion 0.05 MICROgram(s)/kG/Min (10.294 mL/Hr) IV Continuous <Continuous>  senna 2 Tablet(s) Oral at bedtime  simvastatin 20 milliGRAM(s) Oral at bedtime    MEDICATIONS  (PRN):  acetaminophen   Tablet .. 650 milliGRAM(s) Oral every 6 hours PRN Moderate Pain (4 - 6)  cyclobenzaprine 10 milliGRAM(s) Oral at bedtime PRN Muscle Spasm      HOME MEDICATIONS:  Home Medications:  amLODIPine 2.5 mg oral tablet: 1 tab(s) orally once a day (27 Jul 2019 02:31)  cyclobenzaprine 10 mg oral tablet: 1 tab(s) orally once a day, As Needed (27 Jul 2019 02:32)  gabapentin 400 mg oral capsule: 1 cap(s) orally 3 times a day (27 Jul 2019 02:34)  hydroCHLOROthiazide 12.5 mg oral capsule: 1 cap(s) orally once a day (27 Jul 2019 02:29)  ibuprofen 800 mg oral tablet: 1 tab(s) orally 2 times a day (27 Jul 2019 02:32)  metFORMIN 500 mg oral tablet: 1 tab(s) orally once a day (27 Jul 2019 02:33)  methadone: 80 milligram(s) orally once a day (27 Jul 2019 02:29)  simvastatin 20 mg oral tablet: 1 tab(s) orally once a day (at bedtime) (27 Jul 2019 02:29)  valsartan 160 mg oral tablet: 1 tab(s) orally once a day (27 Jul 2019 02:31)      VITALS:   T(F): 98.1 (07-28 @ 12:20), Max: 98.9 (07-28 @ 00:00)  HR: 62 (07-28 @ 14:15) (48 - 92)  BP: 94/49 (07-28 @ 14:15) (74/40 - 128/59)  BP(mean): 64 (07-28 @ 14:15) (51 - 87)  RR: 17 (07-28 @ 14:15) (11 - 30)  SpO2: 95% (07-28 @ 14:15) (94% - 100%)    I&O's Summary    27 Jul 2019 07:01  -  28 Jul 2019 07:00  --------------------------------------------------------  IN: 1861.6 mL / OUT: 3530 mL / NET: -1668.4 mL    28 Jul 2019 07:01  -  28 Jul 2019 15:35  --------------------------------------------------------  IN: 345 mL / OUT: 750 mL / NET: -405 mL        REVIEW OF SYSTEMS:  CONSTITUTIONAL: No weakness, fevers or chills  EYES/ENT: No visual changes;  No vertigo or throat pain   NECK: No pain or stiffness  RESPIRATORY: No cough, wheezing, hemoptysis; No shortness of breath  CARDIOVASCULAR: No chest pain or palpitations  GASTROINTESTINAL: No abdominal or epigastric pain. No nausea, vomiting, or hematemesis; No diarrhea or constipation. No melena or hematochezia.  GENITOURINARY: No dysuria, frequency or hematuria  NEUROLOGICAL: No numbness or weakness  SKIN: No itching, no rashes    PHYSICAL EXAM:  NEURO: patient is awake , alert and oriented  GEN: Not in acute distress  NECK: no thyroid enlargement, no JVD  LUNGS: Clear to auscultation bilaterally   CARDIOVASCULAR: S1/S2 present, RRR , no murmurs or rubs, no carotid bruits,  + PP bilaterally  ABD: Soft, non-tender, non-distended, +BS  EXT: No LEONARDO  SKIN: Intact    LABS:                        9.6    5.17  )-----------( 226      ( 28 Jul 2019 04:30 )             29.6     07-28    139  |  101  |  45<H>  ----------------------------<  121<H>  4.4   |  29  |  2.1<H>    Ca    8.8      28 Jul 2019 04:30  Phos  3.7     07-28  Mg     1.9     07-28    TPro  7.7  /  Alb  4.3  /  TBili  0.3  /  DBili  x   /  AST  20  /  ALT  25  /  AlkPhos  87  07-26    PT/INR - ( 26 Jul 2019 19:39 )   PT: 12.50 sec;   INR: 1.09 ratio         PTT - ( 26 Jul 2019 19:39 )  PTT:30.8 sec    CARDIAC MARKERS ( 27 Jul 2019 01:02 )  x     / <0.01 ng/mL / 477 U/L / x     / 7.1 ng/mL  CARDIAC MARKERS ( 26 Jul 2019 19:39 )  x     / 0.01 ng/mL / 574 U/L / x     / 8.2 ng/mL      Troponin trend:    Serum Pro-Brain Natriuretic Peptide: 567 pg/mL (07-27-19 @ 01:02)      RADIOLOGY:  -CXR:  No acute cardiopulmonary disease    -TTE: Pending      ECG:  NSR @77bpm  TELEMETRY EVENTS:  sinus bradycardia

## 2019-07-28 NOTE — CONSULT NOTE ADULT - ASSESSMENT
58 year old ender with history of HTN, DLD and chronic back pain on methadone presented to ED for confusion, found to have acute renal failure and metabolic encephalopathy complicated by unexplained hypotension and bradycardia.    A & P:    Hypotension and Bradycardia  Acute Renal Failure  Metabolic encephalopathy    - EKG reviewed and shows NSR @77bpm  - serial cardiac troponins are neagtive  - Tele monitoring shows sinus bradycardia with lowest heart rate in low 50's, no blocks, no pauses  - rule out underlying sepsis, hypothyroidism, adrenal insufficiency, VTE  - takes methadone, gabapentin, and cyclobenzaprine, and in the setting of renal failure can explain hypotension and bradycardia  - check TSH, morning cortisol levels, blood/urine cultures, dose adjust methadone, and gabapentin  - wean off levophed, obtain 2d echo

## 2019-07-28 NOTE — PROGRESS NOTE ADULT - SUBJECTIVE AND OBJECTIVE BOX
I received clinical utilization review request from [facility] for BKA prosthesis; please get requested clinical information and much of the information that [employer] is requesting will need to be provided from company providing the prosthesis; please forward to that company so they can provide requested information    OVERNIGHT EVENTS: events noted, on NC 3 L , 1 L 1/2 BICARB 75 CC/H, ON LOW DOSE LEVOPHED, MORE  AWAKE    Vital Signs Last 24 Hrs  T(C): 36.1 (2019 04:00), Max: 37.2 (2019 00:00)  T(F): 97 (2019 04:00), Max: 98.9 (2019 00:00)  HR: 58 (2019 06:00) (50 - 63)  BP: 117/57 (2019 06:00) (92/48 - 128/59)  BP(mean): 77 (2019 06:00) (57 - 83)  RR: 12 (:00) (11 - 25)  SpO2: 99% (2019 06:00) (96% - 100%)    PHYSICAL EXAMINATION:    GENERAL: AXOX3  HEENT: Head is normocephalic and atraumatic. Extraocular muscles are intact. Mucous membranes are moist.    NECK: Supple.    LUNGS: DEC BS BOTH BASES    HEART: Regular rate and rhythm without murmur.    ABDOMEN: Soft, nontender, and nondistended.      EXTREMITIES: Without any cyanosis, clubbing, rash, lesions or edema.    NEUROLOGIC: Grossly intact.    SKIN: No ulceration or induration present.      LABS:                        9.6    5.17  )-----------( 226      ( 2019 04:30 )             29.6     07-28    139  |  101  |  45<H>  ----------------------------<  121<H>  4.4   |  29  |  2.1<H>    Ca    8.8      2019 04:30  Phos  3.7       Mg     1.9         TPro  7.7  /  Alb  4.3  /  TBili  0.3  /  DBili  x   /  AST  20  /  ALT  25  /  AlkPhos  87  07-26    PT/INR - ( 2019 19:39 )   PT: 12.50 sec;   INR: 1.09 ratio         PTT - ( 2019 19:39 )  PTT:30.8 sec  Urinalysis Basic - ( 2019 01:30 )    Color: Light Yellow / Appearance: Clear / S.021 / pH: x  Gluc: x / Ketone: Negative  / Bili: Negative / Urobili: <2 mg/dL   Blood: x / Protein: Trace / Nitrite: Negative   Leuk Esterase: Negative / RBC: x / WBC x   Sq Epi: x / Non Sq Epi: x / Bacteria: x        CARDIAC MARKERS ( 2019 01:02 )  x     / <0.01 ng/mL / 477 U/L / x     / 7.1 ng/mL  CARDIAC MARKERS ( 2019 19:39 )  x     / 0.01 ng/mL / 574 U/L / x     / 8.2 ng/mL      D-Dimer Assay, Quantitative: 856 ng/mL DDU (19 @ 11:28)    Serum Pro-Brain Natriuretic Peptide: 567 pg/mL (19 @ 01:02)            19 @ 07:01  -  19 @ 07:00  --------------------------------------------------------  IN: 1861.6 mL / OUT: 3530 mL / NET: -1668.4 mL        MICROBIOLOGY:  Culture Results:   No growth to date. ( @ 01:02)      MEDICATIONS  (STANDING):  ALBUTerol/ipratropium for Nebulization 3 milliLiter(s) Nebulizer every 4 hours  docusate sodium 100 milliGRAM(s) Oral three times a day  gabapentin 400 milliGRAM(s) Oral at bedtime  heparin  Injectable 5000 Unit(s) SubCutaneous every 8 hours  methadone    Tablet 80 milliGRAM(s) Oral daily  methadone    Tablet 80 milliGRAM(s) Oral daily  norepinephrine Infusion 0.05 MICROgram(s)/kG/Min (10.294 mL/Hr) IV Continuous <Continuous>  senna 2 Tablet(s) Oral at bedtime  simvastatin 20 milliGRAM(s) Oral at bedtime  sodium chloride 0.45% 1000 milliLiter(s) (75 mL/Hr) IV Continuous <Continuous>    MEDICATIONS  (PRN):  acetaminophen   Tablet .. 650 milliGRAM(s) Oral every 6 hours PRN Moderate Pain (4 - 6)  cyclobenzaprine 10 milliGRAM(s) Oral at bedtime PRN Muscle Spasm      RADIOLOGY & ADDITIONAL STUDIES:

## 2019-07-28 NOTE — PROGRESS NOTE ADULT - ASSESSMENT
MILAN/ change in mental status/ hypotension:  # ? prerenal inn nature  # non oliguric  # change iv fluids to 1/s ns with 75 meq of bicarbonate at 75 cc /h  # remains on pressor  # check osmolar gap  # check renal ultrasound  # ph noted, no binders  # neurology f/u  # no acute indication for RRT  # will follow MILAN/ change in mental status/ hypotension:  # ? prerenal in nature improving   # non oliguric  # continue IVF 1/2 NS at 75 cc per hour /ok to DC bicarb   # Sono no hydro   # ph noted, no binders  # neurology f/u noted/ consider holding neurontin if possible    # will follow

## 2019-07-28 NOTE — PROGRESS NOTE ADULT - ASSESSMENT
a 58 year old ender with history of HTN, DLD and chronic back pain on methadone presented to ED for confusion/ was hypotensive/ s/p IVF/ on low dose levophed/ no source of infection/ no fever/ no WBC/ no neck stffness/ however Acute renal failure/ on NSAIDS/ gabapentin, doubt PE. today is his hospital day 2 . Patient appears oriented and his cognitive functions have returned back to normal. No overnight events.	    *Metabolic encephalopathy sec to MILAN improved   -MILAN sec to methadone/gabapentin/ibrupofen  -CT head -ve  -EEG -ve, urine toxi reveals methadone, pt has been taking it for back pain  -neurocheck every 6 hours  -folate, TSH and vitamin b12 all NL      *Hypotension, possibly due to gabapentin  - Blood pressure has improved   - D/C IV hydration, CK downtrending  - wean off levophed  -UA and blood Cx negative,  - F/u cortisol  - f/U ECHO    *MILAN   -Cr down trending now ,2.1 from 7  - ATN from medications (NSAIDs), post renal (unclear if he was retaining)  -hold ace inh, NSAIDs and diuretics  -urine Na NL  -US kidney and bladder -ve  -MAP target 65    Plan  F/u lower extremity doppler  CXR  f/u cortisol levels  D/c fluids  F/U CBC and BMP  F/U UA and Urine Cx  *DLD c/w simvastatin  consult cardio and ID          *DVT ppx: heparin s/c, le doppler    *ICU monitoring for now

## 2019-07-29 LAB
ANION GAP SERPL CALC-SCNC: 8 MMOL/L — SIGNIFICANT CHANGE UP (ref 7–14)
BASOPHILS # BLD AUTO: 0.02 K/UL — SIGNIFICANT CHANGE UP (ref 0–0.2)
BASOPHILS NFR BLD AUTO: 0.4 % — SIGNIFICANT CHANGE UP (ref 0–1)
BUN SERPL-MCNC: 25 MG/DL — HIGH (ref 10–20)
CALCIUM SERPL-MCNC: 8.4 MG/DL — LOW (ref 8.5–10.1)
CHLORIDE SERPL-SCNC: 103 MMOL/L — SIGNIFICANT CHANGE UP (ref 98–110)
CO2 SERPL-SCNC: 30 MMOL/L — SIGNIFICANT CHANGE UP (ref 17–32)
CREAT SERPL-MCNC: 1.1 MG/DL — SIGNIFICANT CHANGE UP (ref 0.7–1.5)
CULTURE RESULTS: NO GROWTH — SIGNIFICANT CHANGE UP
EOSINOPHIL # BLD AUTO: 0.17 K/UL — SIGNIFICANT CHANGE UP (ref 0–0.7)
EOSINOPHIL NFR BLD AUTO: 3.6 % — SIGNIFICANT CHANGE UP (ref 0–8)
GLUCOSE BLDC GLUCOMTR-MCNC: 73 MG/DL — SIGNIFICANT CHANGE UP (ref 70–99)
GLUCOSE BLDC GLUCOMTR-MCNC: 78 MG/DL — SIGNIFICANT CHANGE UP (ref 70–99)
GLUCOSE BLDC GLUCOMTR-MCNC: 88 MG/DL — SIGNIFICANT CHANGE UP (ref 70–99)
GLUCOSE SERPL-MCNC: 107 MG/DL — HIGH (ref 70–99)
HCT VFR BLD CALC: 28.5 % — LOW (ref 42–52)
HGB BLD-MCNC: 9.3 G/DL — LOW (ref 14–18)
IMM GRANULOCYTES NFR BLD AUTO: 0.2 % — SIGNIFICANT CHANGE UP (ref 0.1–0.3)
LYMPHOCYTES # BLD AUTO: 1.06 K/UL — LOW (ref 1.2–3.4)
LYMPHOCYTES # BLD AUTO: 22.2 % — SIGNIFICANT CHANGE UP (ref 20.5–51.1)
MCHC RBC-ENTMCNC: 30.2 PG — SIGNIFICANT CHANGE UP (ref 27–31)
MCHC RBC-ENTMCNC: 32.6 G/DL — SIGNIFICANT CHANGE UP (ref 32–37)
MCV RBC AUTO: 92.5 FL — SIGNIFICANT CHANGE UP (ref 80–94)
MONOCYTES # BLD AUTO: 0.46 K/UL — SIGNIFICANT CHANGE UP (ref 0.1–0.6)
MONOCYTES NFR BLD AUTO: 9.6 % — HIGH (ref 1.7–9.3)
NEUTROPHILS # BLD AUTO: 3.06 K/UL — SIGNIFICANT CHANGE UP (ref 1.4–6.5)
NEUTROPHILS NFR BLD AUTO: 64 % — SIGNIFICANT CHANGE UP (ref 42.2–75.2)
NRBC # BLD: 0 /100 WBCS — SIGNIFICANT CHANGE UP (ref 0–0)
PLATELET # BLD AUTO: 212 K/UL — SIGNIFICANT CHANGE UP (ref 130–400)
POTASSIUM SERPL-MCNC: 4.2 MMOL/L — SIGNIFICANT CHANGE UP (ref 3.5–5)
POTASSIUM SERPL-SCNC: 4.2 MMOL/L — SIGNIFICANT CHANGE UP (ref 3.5–5)
RBC # BLD: 3.08 M/UL — LOW (ref 4.7–6.1)
RBC # FLD: 12.5 % — SIGNIFICANT CHANGE UP (ref 11.5–14.5)
SODIUM SERPL-SCNC: 141 MMOL/L — SIGNIFICANT CHANGE UP (ref 135–146)
SPECIMEN SOURCE: SIGNIFICANT CHANGE UP
WBC # BLD: 4.78 K/UL — LOW (ref 4.8–10.8)
WBC # FLD AUTO: 4.78 K/UL — LOW (ref 4.8–10.8)

## 2019-07-29 PROCEDURE — 99222 1ST HOSP IP/OBS MODERATE 55: CPT

## 2019-07-29 PROCEDURE — 71045 X-RAY EXAM CHEST 1 VIEW: CPT | Mod: 26

## 2019-07-29 PROCEDURE — 73610 X-RAY EXAM OF ANKLE: CPT | Mod: 26,RT

## 2019-07-29 RX ORDER — GABAPENTIN 400 MG/1
100 CAPSULE ORAL EVERY 8 HOURS
Refills: 0 | Status: DISCONTINUED | OUTPATIENT
Start: 2019-07-29 | End: 2019-07-29

## 2019-07-29 RX ORDER — IPRATROPIUM/ALBUTEROL SULFATE 18-103MCG
3 AEROSOL WITH ADAPTER (GRAM) INHALATION EVERY 6 HOURS
Refills: 0 | Status: DISCONTINUED | OUTPATIENT
Start: 2019-07-29 | End: 2019-07-30

## 2019-07-29 RX ORDER — GABAPENTIN 400 MG/1
200 CAPSULE ORAL EVERY 8 HOURS
Refills: 0 | Status: DISCONTINUED | OUTPATIENT
Start: 2019-07-29 | End: 2019-07-29

## 2019-07-29 RX ORDER — CHLORHEXIDINE GLUCONATE 213 G/1000ML
1 SOLUTION TOPICAL
Refills: 0 | Status: DISCONTINUED | OUTPATIENT
Start: 2019-07-29 | End: 2019-07-30

## 2019-07-29 RX ADMIN — Medication 100 MILLIGRAM(S): at 21:30

## 2019-07-29 RX ADMIN — HEPARIN SODIUM 5000 UNIT(S): 5000 INJECTION INTRAVENOUS; SUBCUTANEOUS at 21:30

## 2019-07-29 RX ADMIN — SENNA PLUS 2 TABLET(S): 8.6 TABLET ORAL at 21:30

## 2019-07-29 RX ADMIN — METHADONE HYDROCHLORIDE 80 MILLIGRAM(S): 40 TABLET ORAL at 11:48

## 2019-07-29 RX ADMIN — HEPARIN SODIUM 5000 UNIT(S): 5000 INJECTION INTRAVENOUS; SUBCUTANEOUS at 13:52

## 2019-07-29 RX ADMIN — HEPARIN SODIUM 5000 UNIT(S): 5000 INJECTION INTRAVENOUS; SUBCUTANEOUS at 05:42

## 2019-07-29 RX ADMIN — Medication 100 MILLIGRAM(S): at 05:44

## 2019-07-29 RX ADMIN — Medication 100 MILLIGRAM(S): at 13:51

## 2019-07-29 RX ADMIN — SIMVASTATIN 20 MILLIGRAM(S): 20 TABLET, FILM COATED ORAL at 21:30

## 2019-07-29 RX ADMIN — CHLORHEXIDINE GLUCONATE 1 APPLICATION(S): 213 SOLUTION TOPICAL at 19:08

## 2019-07-29 NOTE — PROGRESS NOTE ADULT - ASSESSMENT
Alterted mental status - multifactorial   Shock state - unknown cause   Ankle fracture   MILAN - resolved     PLAN:    CNS: Cont methadone, hold gabapentin one more day, prn flexeril. MRI per neuro     HEENT: Oral care     PULMONARY:  HOB @ 45 degrees, Off O2     CARDIOVASCULAR: Keep off antihypertensive taper off levophed, f/u cortisol levels     GI: GI prophylaxis.  Feeding    RENAL:  Follow up lytes.  Correct as needed    INFECTIOUS DISEASE: Follow up cultures. Keep off abx     HEMATOLOGICAL:  DVT prophylaxis.     ENDOCRINE:  Follow up FS.  Insulin protocol if needed.    MUSCULOSKELETAL: OOB to chair. Orthopedic f/u     Lines: peripherals   Mitchell: DC   Code status: Full   Prognosis: Good   Dispo: IF still off levo possible downgrade in PM Alterted mental status - multifactorial   Shock state suspect/ renal failure/ meds induced  Ankle fracture   MILAN - resolved     PLAN:    CNS: Cont methadone, hold gabapentin one more day, prn flexeril. MRI per neuro ?    HEENT: Oral care     PULMONARY:  HOB @ 45 degrees, Off O2     CARDIOVASCULAR: Keep off antihypertensive taper off levophed, f/u cortisol levels     GI: GI prophylaxis.  Feeding    RENAL:  Follow up lytes.  Correct as needed    INFECTIOUS DISEASE: Follow up cultures. Keep off abx  per ID    HEMATOLOGICAL:  DVT prophylaxis.     ENDOCRINE:  Follow up FS.  Insulin protocol if needed.    MUSCULOSKELETAL: OOB to chair. Orthopedic f/u     Lines: peripherals   Mitchell: DC   Code status: Full   Prognosis: Good   Dispo: IF still off levo possible downgrade in PM

## 2019-07-29 NOTE — CONSULT NOTE ADULT - ASSESSMENT
58 year old ender with history of HTN, DLD and chronic back pain on methadone presented to ED for confusion. he called his daughter at around 4 pm telling her that he feels confused, she went there and noticed that he had tremors, could not hold anything in his right hand (everything is dropping), had slurred speech, and could not remember simple daily things he used to.   History is also relevant for a fall 2 days ago that was unwitnessed and was complicated by right ankle fracture currently in a cast.  On admission see no evidence of infection.   No CNS infection as the pt is presently alert, responsive and has no complaints  Fevers from last pm possibly related to his left femoral catheter   No PNA  No pyuria  No intraabdominal pathology  Initially had MSOF which has resolved.  Acute renal failure with normalization of creatinine  BCx 7/27 NG    IMPRESSION:  R/o left femoral catheter infection  No PNA  No CNS infection    RECOMMENDATIONS:  BCx x 2 at least half an hour from 2 different sites  Vancomycin 1 gm iv q12h  Meropenem 1 gm iv q8h  Diflucan 800 mg iv x once   Above till BCx known  D/c the left femoral catheter  D/c shields 58 year old ender with history of HTN, DLD and chronic back pain on methadone presented to ED for confusion. he called his daughter at around 4 pm telling her that he feels confused, she went there and noticed that he had tremors, could not hold anything in his right hand (everything is dropping), had slurred speech, and could not remember simple daily things he used to.   History is also relevant for a fall 2 days ago that was unwitnessed and was complicated by right ankle fracture currently in a cast.  On admission see no evidence of infection.   No CNS infection as the pt is presently alert, responsive and has no complaints  Fevers from last pm possibly related to his left femoral catheter   No PNA  No pyuria  No intraabdominal pathology  Initially had MSOF which has resolved.  Acute renal failure with normalization of creatinine  BCx 7/27 NG    IMPRESSION:  No ongoing infectious process  No PNA  No CNS infection    RECOMMENDATIONS:  Maintain off ABx  D/c the left femoral catheter  D/c shields

## 2019-07-29 NOTE — PROGRESS NOTE ADULT - SUBJECTIVE AND OBJECTIVE BOX
TRAVIS LEYVA 58y Male  MRN#: [id number]   CODE STATUS: FULL      SUBJECTIVE  Patient is a 58y old Male who presents with a chief complaint of Confusion (2019 08:59)    Currently admitted to medicine with the primary diagnosis of Metabolic encephalopathy suspected gabapentin toxicity    Today is hospital day 2d, and this morning he is laying in bed comfortably and reports no overnight events.   He is alert and off Levophed. Denies any tremors. No new complaints.    OBJECTIVE  PAST MEDICAL & SURGICAL HISTORY  Ankle fracture: left ankle  Fx x 2 with surgical repair  High cholesterol  HTN (hypertension)  Diabetes mellitus  History of ankle surgery    ALLERGIES:  No Known Allergies    HOME MEDICATIONS:  Home Medications:  amLODIPine 2.5 mg oral tablet: 1 tab(s) orally once a day (2019 02:31)  cyclobenzaprine 10 mg oral tablet: 1 tab(s) orally once a day, As Needed (2019 02:32)  gabapentin 400 mg oral capsule: 1 cap(s) orally 3 times a day (2019 02:34)  hydroCHLOROthiazide 12.5 mg oral capsule: 1 cap(s) orally once a day (2019 02:29)  ibuprofen 800 mg oral tablet: 1 tab(s) orally 2 times a day (2019 02:32)  metFORMIN 500 mg oral tablet: 1 tab(s) orally once a day (2019 02:33)  methadone: 80 milligram(s) orally once a day (2019 02:29)  simvastatin 20 mg oral tablet: 1 tab(s) orally once a day (at bedtime) (2019 02:29)  valsartan 160 mg oral tablet: 1 tab(s) orally once a day (2019 02:31)    MEDICATIONS:  STANDING MEDICATIONS  chlorhexidine 4% Liquid 1 Application(s) Topical two times a day  docusate sodium 100 milliGRAM(s) Oral three times a day  heparin  Injectable 5000 Unit(s) SubCutaneous every 8 hours  methadone    Tablet 80 milliGRAM(s) Oral daily  senna 2 Tablet(s) Oral at bedtime  simvastatin 20 milliGRAM(s) Oral at bedtime    PRN MEDICATIONS  acetaminophen   Tablet .. 650 milliGRAM(s) Oral every 6 hours PRN  ALBUTerol/ipratropium for Nebulization 3 milliLiter(s) Nebulizer every 6 hours PRN  cyclobenzaprine 10 milliGRAM(s) Oral at bedtime PRN      VITAL SIGNS: Last 24 Hours  T(C): 36.5 (2019 11:47), Max: 36.9 (2019 16:15)  T(F): 97.7 (2019 11:47), Max: 98.4 (2019 16:15)  HR: 70 (2019 11:47) (58 - 70)  BP: 129/67 (2019 11:47) (94/49 - 132/55)  BP(mean): 88 (2019 11:47) (60 - 96)  RR: 21 (2019 11:47) (9 - 39)  SpO2: 98% (2019 11:47) (94% - 98%)    LABS:                        9.3    4.78  )-----------( 212      ( 2019 04:30 )             28.5     07-29    141  |  103  |  25<H>  ----------------------------<  107<H>  4.2   |  30  |  1.1    Ca    8.4<L>      2019 04:30  Phos  3.7     07-28  Mg     1.9     07-28        Urinalysis Basic - ( 2019 16:43 )    Color: Light Yellow / Appearance: Clear / S.021 / pH: x  Gluc: x / Ketone: Negative  / Bili: Negative / Urobili: <2 mg/dL   Blood: x / Protein: Trace / Nitrite: Negative   Leuk Esterase: Small / RBC: 8 /HPF / WBC 4 /HPF   Sq Epi: x / Non Sq Epi: 1 /HPF / Bacteria: Negative                Culture - Urine (collected 2019 01:30)  Source: .Urine Catheterized  Final Report (2019 13:22):    No growth    Culture - Blood (collected 2019 01:02)  Source: .Blood Blood  Preliminary Report (2019 07:00):    No growth to date.      RADIOLOGY:  Xray Chest 1 View- PORTABLE-Routine:   EXAM:  XR CHEST PORTABLE ROUTINE 1V            PROCEDURE DATE:  2019            INTERPRETATION:  CLINICAL INDICATION:  Shortness of breath    COMPARISON: Chest radiograph dated 2019    TECHNIQUE: Frontal radiograph the chest. Low lung volumes.     FINDINGS:    Support devices: None.    Cardiac/mediastinum/hilum: Stable.    Lung parenchyma/Pleura: No focal consolidation.  There is no pneumothorax.    Skeleton/soft tissues: Stable.    IMPRESSION:     No radiographic evidence of acute cardiopulmonary disease.                    ESTELA FELTON M.D., ATTENDING RADIOLOGIST  This document has been electronically signed. 2019 12:13PM             (19 @ 04:05)          ECHO:  Transthoracic Echocardiogram:    EXAM:  2-D ECHO (TTE) COMPLETE        PROCEDURE DATE:  2019      INTERPRETATION:  REPORT:    TRANSTHORACIC ECHOCARDIOGRAM REPORT         Patient Name:   TRAVIS LEYVA Accession #: [id number]  Medical Rec #:  [id number]       Height:      68.0 in 172.7 cm  YOB: 1960      Weight:      251.3 lb 114.00 kg  Patient Age:    58 years       BSA:         2.25 m²  Patient Gender: M              BP:          87/50 mmHg       Date of Exam:        2019 12:11:01 PM  Referring Physician: [id number] JD CROOK  Sonographer:         Lebron Maldonado  Reading Physician:   Nestor Dupont M.D.    Procedure:     2D Echo/Doppler/Color Doppler Complete.  Indications:   I42.9 - Cardiomyopathy, unspecified  Diagnosis:     Cardiomyopathy, unspecified - I42.9  Study Details: Technically fair study. Study quality was adversely   affected due                 to patient obesity.         Summary:   1. LV Ejection Fraction by Gordon's Method with a biplane EF of 66 %.    PHYSICIAN INTERPRETATION:  Left Ventricle: Normal left ventricular size and wall thicknesses, with   normal systolic and diastolic function.  Right Ventricle: Normal right ventricular size and function.  Left Atrium: Normal left atrial size.  Right Atrium: Normal right atrial size.  Pericardium: There is no evidence of pericardial effusion.  Mitral Valve: Structurally normal mitral valve, with normal leaflet   excursion. The mitral valve is normal in structure. No evidence of mitral   valve regurgitation is seen.  Tricuspid Valve: Structurally normal tricuspid valve, with normal leaflet   excursion. The tricuspid valve is normal in structure. Mild tricuspid   regurgitation is visualized.  Aortic Valve: Normal trileaflet aortic valve with normal opening. The   aortic valve is normal. No evidence of aortic valve regurgitation is seen.  Pulmonic Valve: Structurally normal pulmonic valve, with normal leaflet   excursion. The pulmonic valve is normal. No indication of pulmonic valve   regurgitation.  Aorta: The aorticroot and ascending aorta are structurally normal, with   no evidence of dilitation.  Pulmonary Artery: The main pulmonary artery is normal in size.       2D AND M-MODE MEASUREMENTS (normal ranges within parentheses):  Left                  Normal   Aorta/Left             Normal  Ventricle:                     Atrium:  IVSd (2D):  1.12 cm  (0.7-1.1) AoV Cusp       2.34  (1.5-2.6)  LVPWd       1.07 cm  (0.7-1.1) Separation:     cm  (2D):                          Left Atrium    4.38  (1.9-4.0)  LVIDd       5.06 cm  (3.4-5.7) (Mmode):        cm  (2D):                          Right  LVIDs       3.32 cm            Ventricle:  (2D):                          RVd (Mmode):   3.60 cm  LV FS       34.4 %    (>25%)   RVd (2D):      2.73 cm  (2D):  IVSd     0.92 cm  (0.7-1.1)  (Mmode):  LVPWd       1.07 cm  (0.7-1.1)  (Mmode):  LVIDd       4.93 cm  (3.4-5.7)  (Mmode):  LVIDs       3.09 cm  (Mmode):  LV FS       37.2 %    (>25%)  (Mmode):  Relative     0.42     (<0.42)  Wall  Thickness  Rel. Wall0.44     (<0.42)  Thickness  Mm  LV Mass    78.2 g/m²  Index:  Mmode    SPECTRAL DOPPLER ANALYSIS:  LV DIASTOLIC FUNCTION:  MV Peak E: 0.77 m/s Decel Time: 308 msec  MV Peak A: 0.61 m/s  E/A Ratio: 1.26    Aortic Valve:  AoV VMax:    1.58 m/s  AoV Area, Vmax: 3.39 cm² Vmax Indx: 1.50 cm²/m²  AoV Pk Grad: 10.0 mmHg    LVOT Vmax: 1.40 m/s  LVOT VTI:  0.31 m  LVOT Diam: 2.21 cm    Mitral Valve:  MV P1/2 Time: 89.25 msec  MV Area, PHT: 2.47 cm²    Tricuspid Valve and PA/RV Systolic Pressure: TR Max Velocity: 2.39 m/s RA   Pressure: 5 mmHg RVSP/PASP: 27.9 mmHg       [id number] Nestor Dupont M.D., Electronically signed on 2019 at 3:40:06   PM              *** Final ***                    NESTOR DUPONT MD  This document has been electronically signed. 2019 12:11PM             (19 @ 12:11)      PHYSICAL EXAM:    GENERAL: NAD, well-developed, AAOx3  HEENT:  Atraumatic, Normocephalic. EOMI, PERRLA, conjunctiva and sclera clear, No JVD  PULMONARY: Clear to auscultation bilaterally; No wheeze  CARDIOVASCULAR: Regular rate and rhythm; No murmurs, rubs, or gallops  GASTROINTESTINAL: Soft, Nontender, Nondistended; Bowel sounds present  MUSCULOSKELETAL:  2+ Peripheral Pulses, No clubbing, cyanosis, or edema  NEUROLOGY: non-focal  SKIN: No rashes or lesions    ASSESSMENT & PLAN  58 year old man with history of HTN, DLD and chronic back pain on methadone presented to ED for confusion/ was hypotensive/ s/p IVF/ on low dose levophed/ no source of infection/ no fever/ no WBC/ no neck stiffness however Acute renal failure/ on NSAIDS/ gabapentin, doubt PE. today is his hospital day 2 . Patient appears oriented and his cognitive functions have returned back to normal. No overnight events.	    #Altered Mental Status :multifactorial  -Improved ; Pt AAOx3  -MILAN sec to possible gabapentin toxicity which has improved  -Creatinine, Serum: 1.1 mg/dL (. @ 04:30)<<2.1 mg/dL (.. @ 04:30)<< 3.5 mg/dL (..19 @ 16:55)<< 7.1: Critical value: mg/dL (. @ 23:30)  -CT head -ve  -EEG -ve, urine toxi reveals methadone, pt has been taking it for back pain  -neurocheck every 6 hours  -folate, TSH and vitamin b12 all NL      #Hypotension Shock possibly due to gabapentin  - Blood pressure has improved   - Levophed discontinued  -UA and blood Cx negative,  - F/u cortisol am   - TTE on  :LV Ejection Fraction by Gordon's Method with a biplane EF of 66 %.      #MILAN   -Cr down trending now ,2.1 from 7  - ATN from medications (NSAIDs), post renal (unclear if he was retaining)  -hold ace inh, NSAIDs and diuretics  -urine Na NL  -US kidney and bladder -ve  -MAP target 65    Plan  F/u lower extremity doppler  CXR  f/u cortisol levels  D/c fluids  F/U CBC and BMP  F/U UA and Urine Cx  *DLD c/w simvastatin  consult cardio and ID          *DVT ppx: heparin s/c, le doppler

## 2019-07-29 NOTE — PROGRESS NOTE ADULT - SUBJECTIVE AND OBJECTIVE BOX
Patient is a 58y old  Male who presents with a chief complaint of Confusion (2019 07:07)    Over Night Events:    Off Levophed for 1 hour  no other events  No further tremors.   AAO x 3     ROS:  See HPI    PHYSICAL EXAM    ICU Vital Signs Last 24 Hrs  T(C): 36.7 (2019 07:15), Max: 36.9 (2019 16:15)  T(F): 98 (2019 07:15), Max: 98.4 (2019 16:15)  HR: 70 (2019 08:15) (58 - 70)  BP: 122/71 (2019 08:15) (74/40 - 132/55)  BP(mean): 88 (2019 08:15) (51 - 96)  RR: 26 (2019 08:15) (9 - 39)  SpO2: 94% (2019 08:15) (94% - 99%)      General: AAO x 3  HEENT: DARREL             Lymphatic system: No cervical LN   Lungs: Bilateral BS  Cardiovascular: Regular   Gastrointestinal: Soft, Positive BS  Extremities: No clubbing.  Moves extremities.  Full Range of motion , RLE cast   Skin: Warm, intact  Neurological: No motor or sensory deficit       19 @ 07:01  -  - @ 07:00  --------------------------------------------------------  IN:    norepinephrine Infusion: 41 mL    Oral Fluid: 490 mL    sodium chloride 0.45%: 75 mL  Total IN: 606 mL    OUT:    Indwelling Catheter - Urethral: 1750 mL  Total OUT: 1750 mL    Total NET: -1144 mL          LABS:                            9.3    4.78  )-----------( 212      ( 2019 04:30 )             28.5                                               07-29    141  |  103  |  25<H>  ----------------------------<  107<H>  4.2   |  30  |  1.1    Ca    8.4<L>      2019 04:30  Phos  3.7     07-28  Mg     1.9     07-28    Urinalysis Basic - ( 2019 16:43 )    Color: Light Yellow / Appearance: Clear / S.021 / pH: x  Gluc: x / Ketone: Negative  / Bili: Negative / Urobili: <2 mg/dL   Blood: x / Protein: Trace / Nitrite: Negative   Leuk Esterase: Small / RBC: 8 /HPF / WBC 4 /HPF   Sq Epi: x / Non Sq Epi: 1 /HPF / Bacteria: Negative    Culture - Urine (collected 2019 01:30)  Source: .Urine Catheterized  Final Report (2019 13:22):    No growth    Culture - Blood (collected 2019 01:02)  Source: .Blood Blood  Preliminary Report (2019 07:00):    No growth to date.      MEDICATIONS  (STANDING):  ALBUTerol/ipratropium for Nebulization 3 milliLiter(s) Nebulizer every 4 hours  docusate sodium 100 milliGRAM(s) Oral three times a day  heparin  Injectable 5000 Unit(s) SubCutaneous every 8 hours  methadone    Tablet 80 milliGRAM(s) Oral daily  methadone    Tablet 80 milliGRAM(s) Oral daily  norepinephrine Infusion 0.05 MICROgram(s)/kG/Min (10.294 mL/Hr) IV Continuous <Continuous>  senna 2 Tablet(s) Oral at bedtime  simvastatin 20 milliGRAM(s) Oral at bedtime    MEDICATIONS  (PRN):  acetaminophen   Tablet .. 650 milliGRAM(s) Oral every 6 hours PRN Moderate Pain (4 - 6)  cyclobenzaprine 10 milliGRAM(s) Oral at bedtime PRN Muscle Spasm    Xrays:                                                                                     ECHO Patient is a 58y old  Male who presents with a chief complaint of Confusion (2019 07:07)    Over Night Events:    Off Levophed  no other events  No further tremors.   AAO x 3   good UO    ROS:  See HPI    PHYSICAL EXAM    ICU Vital Signs Last 24 Hrs  T(C): 36.7 (2019 07:15), Max: 36.9 (2019 16:15)  T(F): 98 (2019 07:15), Max: 98.4 (2019 16:15)  HR: 70 (2019 08:15) (58 - 70)  BP: 122/71 (2019 08:15) (74/40 - 132/55)  BP(mean): 88 (2019 08:15) (51 - 96)  RR: 26 (2019 08:15) (9 - 39)  SpO2: 94% (2019 08:15) (94% - 99%)      General: AAO x 3  HEENT: DARREL             Lymphatic system: No cervical LN   Lungs: Bilateral BS  Cardiovascular: Regular   Gastrointestinal: Soft, Positive BS  Extremities: No clubbing.  Moves extremities.  Full Range of motion , RLE cast   Skin: Warm, intact  Neurological: No motor or sensory deficit       19 @ 07:01  -  - @ 07:00  --------------------------------------------------------  IN:    norepinephrine Infusion: 41 mL    Oral Fluid: 490 mL    sodium chloride 0.45%: 75 mL  Total IN: 606 mL    OUT:    Indwelling Catheter - Urethral: 1750 mL  Total OUT: 1750 mL    Total NET: -1144 mL          LABS:                            9.3    4.78  )-----------( 212      ( 2019 04:30 )             28.5                                               07-29    141  |  103  |  25<H>  ----------------------------<  107<H>  4.2   |  30  |  1.1    Ca    8.4<L>      2019 04:30  Phos  3.7     07-28  Mg     1.9     07-28    Urinalysis Basic - ( 2019 16:43 )    Color: Light Yellow / Appearance: Clear / S.021 / pH: x  Gluc: x / Ketone: Negative  / Bili: Negative / Urobili: <2 mg/dL   Blood: x / Protein: Trace / Nitrite: Negative   Leuk Esterase: Small / RBC: 8 /HPF / WBC 4 /HPF   Sq Epi: x / Non Sq Epi: 1 /HPF / Bacteria: Negative    Culture - Urine (collected 2019 01:30)  Source: .Urine Catheterized  Final Report (2019 13:22):    No growth    Culture - Blood (collected 2019 01:02)  Source: .Blood Blood  Preliminary Report (2019 07:00):    No growth to date.      MEDICATIONS  (STANDING):  ALBUTerol/ipratropium for Nebulization 3 milliLiter(s) Nebulizer every 4 hours  docusate sodium 100 milliGRAM(s) Oral three times a day  heparin  Injectable 5000 Unit(s) SubCutaneous every 8 hours  methadone    Tablet 80 milliGRAM(s) Oral daily  methadone    Tablet 80 milliGRAM(s) Oral daily  norepinephrine Infusion 0.05 MICROgram(s)/kG/Min (10.294 mL/Hr) IV Continuous <Continuous>  senna 2 Tablet(s) Oral at bedtime  simvastatin 20 milliGRAM(s) Oral at bedtime    MEDICATIONS  (PRN):  acetaminophen   Tablet .. 650 milliGRAM(s) Oral every 6 hours PRN Moderate Pain (4 - 6)  cyclobenzaprine 10 milliGRAM(s) Oral at bedtime PRN Muscle Spasm    Xrays: REVIEWED

## 2019-07-29 NOTE — CHART NOTE - NSCHARTNOTEFT_GEN_A_CORE
ICU DOWNGRADE NOTE:    58y Male transferred to floor from ICU    Patient is a 58y old Male who presents with a chief complaint of Confusion (29 Jul 2019 13:52)    The patient is currently admitted for the primary diagnosis of Aletered mental status in the setting of MILAN(acute kidney injury)    The patient was admitted to the unit for 2 Days.    The patient was never intubated for (days), and (was/was never) on pressors for (days).    Indwelling vascular catheters:    Urinary Catheter: none    Disposition: Downgrade to floor    Code Status: FULL CODE    ICU COURSE OF EVENTS: 58 year old man with history of HTN, DLD and chronic back pain on methadone presented to ED for confusion/ was hypotensive/ s/p IVF/ on low dose levophed/ no source of infection/ no fever/ no WBC/ no neck stiffness however Acute renal failure/ on NSAIDS/ gabapentin toxicity which has improved; doubt PE. Today is his hospital day 2 . Patient appears oriented and his cognitive functions have returned back to normal. No overnight events. Creatinine trending down from  Serum: 1.1 mg/dL (07.29.19 @ 04:30)<<2.1 mg/dL (07.28.19 @ 04:30)<< 3.5 mg/dL (07.27.19 @ 16:55)<< 7.1: Critical value: mg/dL (07.26.19 @ 23:30). CT head -ve, EEG -ve, folate, TSH and vitamin b12 all NL. His course was complicated with Hypotensive Shock state, he was on Levophed and today his BP has improved and Levophed has been discontinued. FU am cortisol, BMP and CBC. Patient currently has no ongoing infective process and stable for downgrade 	  -------------------------------------------------------------------------------------------        -------------------------------------------------------------------------------------------    Current workup in progress:     SIGN OUT AT 07-29-19 @ 14:20 GIVEN TO: ICU DOWNGRADE NOTE:    58y Male transferred to floor from ICU    Patient is a 58y old Male who presents with a chief complaint of Confusion (29 Jul 2019 13:52)    The patient is currently admitted for the primary diagnosis of Aletered mental status in the setting of MILAN(acute kidney injury)    The patient was admitted to the unit for 2 Days.    The patient was never intubated for (days), and (was/was never) on pressors for (days).    Indwelling vascular catheters:    Urinary Catheter: none    Disposition: Downgrade to floor    Code Status: FULL CODE    ICU COURSE OF EVENTS: 58 year old man with history of HTN, DLD and chronic back pain on methadone presented to ED for confusion/ was hypotensive/ s/p IVF/ on low dose levophed/ no source of infection/ no fever/ no WBC/ no neck stiffness however Acute renal failure/ on NSAIDS/ gabapentin toxicity which has improved; doubt PE. Today is his hospital day 2 . Patient appears oriented and his cognitive functions have returned back to normal. No overnight events. Creatinine trending down from  Serum: 1.1 mg/dL (07.29.19 @ 04:30)<<2.1 mg/dL (07.28.19 @ 04:30)<< 3.5 mg/dL (07.27.19 @ 16:55)<< 7.1: Critical value: mg/dL (07.26.19 @ 23:30). CT head -ve, EEG -ve, folate, TSH and vitamin b12 all NL. His course was complicated with Hypotensive Shock state, he was on Levophed and today his BP has improved and Levophed has been discontinued. FU am cortisol, BMP and CBC. Patient currently has no ongoing infective process and stable for downgrade 	  -------------------------------------------------------------------------------------------        -------------------------------------------------------------------------------------------    Current workup in progress:     Plan  F/u lower extremity doppler  CXR  f/u cortisol levels  D/c fluids  F/U CBC and BMP  F/U UA and Urine Cx  *DLD c/w simvastatin  consult cardio and ID    SIGN OUT AT 07-29-19 @ 14:20 GIVEN TO:

## 2019-07-29 NOTE — CONSULT NOTE ADULT - SUBJECTIVE AND OBJECTIVE BOX
AUTUMNTRAVIS ELISE  58y, Male  Allergy: No Known Allergies      HPI:  a 58 year old ender with history of HTN, DLD and chronic back pain on methadone presented to ED for confusion. he called his daughter at around 4 pm telling her that he feels confused, she went there and noticed that he had tremors, could not hold anything in his right hand (everything is dropping), had slurred speech, and could not remember simple daily things he used to.   History is also relevant for a fall 2 days ago that was unwitnessed and was complicated by right ankle fracture currently in a cast.  he denies taking extra pill of his medications, he denies dizziness, diarrhea, vomiting, SOB, chest pain, palpitations, abdominal pain, decrease po intake, fever, chills.  he is on ibuprofen 800mg bid but family did not report extra use.    in the ED KQ=237/59 then dropped to 70s, HR=77, T=97.7, sat=98% on RA and RR=22  his cr was 7.7 form baseline of 1.5  AG=18 lac=0.7  s/p IV fluids (1-2L), s/p left femoral triple lumen, currently on levophed. stroke code called, CT HEAD/ NECK NEG, admitted for icu, still on levophed and IVF, no abx. found to be acute renal failure (2019 02:42)    ID called to evaluate if pt has an infection as etiology of his hypotension    FAMILY HISTORY:  No pertinent family history in first degree relatives    PAST MEDICAL & SURGICAL HISTORY:  Ankle fracture: left ankel  Fx x 2 with surgical repair  High cholesterol  HTN (hypertension)  Diabetes mellitus  History of ankle surgery        VITALS:  T(F): 97.9, Max: 98.4 (19 @ 16:15)  HR: 58  BP: 113/65  RR: 11Vital Signs Last 24 Hrs  T(C): 36.6 (2019 04:00), Max: 36.9 (2019 16:15)  T(F): 97.9 (2019 04:00), Max: 98.4 (2019 16:15)  HR: 58 (2019 05:00) (56 - 68)  BP: 113/65 (2019 05:00) (74/40 - 113/65)  BP(mean): 84 (2019 05:00) (51 - 84)  RR: 11 (2019 05:00) (9 - 39)  SpO2: 96% (2019 05:00) (94% - 100%)    TESTS & MEASUREMENTS:                        9.3    4.78  )-----------( 212      ( 2019 04:30 )             28.5     -    141  |  103  |  25<H>  ----------------------------<  107<H>  4.2   |  30  |  1.1    Ca    8.4<L>      2019 04:30  Phos  3.7       Mg     1.9               Culture - Urine (collected 19 @ 01:30)  Source: .Urine Catheterized  Final Report (19 @ 13:22):    No growth    Culture - Blood (collected 19 @ 01:02)  Source: .Blood Blood  Preliminary Report (19 @ 07:00):    No growth to date.      Urinalysis Basic - ( 2019 16:43 )    Color: Light Yellow / Appearance: Clear / S.021 / pH: x  Gluc: x / Ketone: Negative  / Bili: Negative / Urobili: <2 mg/dL   Blood: x / Protein: Trace / Nitrite: Negative   Leuk Esterase: Small / RBC: 8 /HPF / WBC 4 /HPF   Sq Epi: x / Non Sq Epi: 1 /HPF / Bacteria: Negative          RADIOLOGY & ADDITIONAL TESTS:    ANTIBIOTICS:

## 2019-07-29 NOTE — CONSULT NOTE ADULT - NEGATIVE NEUROLOGICAL SYMPTOMS
no transient paralysis/no facial palsy/no generalized seizures/no focal seizures/no paresthesias/no headache

## 2019-07-30 ENCOUNTER — TRANSCRIPTION ENCOUNTER (OUTPATIENT)
Age: 59
End: 2019-07-30

## 2019-07-30 VITALS
TEMPERATURE: 98 F | DIASTOLIC BLOOD PRESSURE: 62 MMHG | HEART RATE: 74 BPM | SYSTOLIC BLOOD PRESSURE: 126 MMHG | RESPIRATION RATE: 18 BRPM

## 2019-07-30 LAB
ANION GAP SERPL CALC-SCNC: 13 MMOL/L — SIGNIFICANT CHANGE UP (ref 7–14)
BASOPHILS # BLD AUTO: 0.02 K/UL — SIGNIFICANT CHANGE UP (ref 0–0.2)
BASOPHILS NFR BLD AUTO: 0.5 % — SIGNIFICANT CHANGE UP (ref 0–1)
BUN SERPL-MCNC: 13 MG/DL — SIGNIFICANT CHANGE UP (ref 10–20)
CALCIUM SERPL-MCNC: 8.9 MG/DL — SIGNIFICANT CHANGE UP (ref 8.5–10.1)
CHLORIDE SERPL-SCNC: 97 MMOL/L — LOW (ref 98–110)
CO2 SERPL-SCNC: 25 MMOL/L — SIGNIFICANT CHANGE UP (ref 17–32)
CREAT SERPL-MCNC: 0.9 MG/DL — SIGNIFICANT CHANGE UP (ref 0.7–1.5)
EOSINOPHIL # BLD AUTO: 0.14 K/UL — SIGNIFICANT CHANGE UP (ref 0–0.7)
EOSINOPHIL NFR BLD AUTO: 3.3 % — SIGNIFICANT CHANGE UP (ref 0–8)
GLUCOSE BLDC GLUCOMTR-MCNC: 101 MG/DL — HIGH (ref 70–99)
GLUCOSE BLDC GLUCOMTR-MCNC: 105 MG/DL — HIGH (ref 70–99)
GLUCOSE BLDC GLUCOMTR-MCNC: 91 MG/DL — SIGNIFICANT CHANGE UP (ref 70–99)
GLUCOSE SERPL-MCNC: 100 MG/DL — HIGH (ref 70–99)
HCT VFR BLD CALC: 31 % — LOW (ref 42–52)
HGB BLD-MCNC: 10.2 G/DL — LOW (ref 14–18)
IMM GRANULOCYTES NFR BLD AUTO: 0.2 % — SIGNIFICANT CHANGE UP (ref 0.1–0.3)
LYMPHOCYTES # BLD AUTO: 0.73 K/UL — LOW (ref 1.2–3.4)
LYMPHOCYTES # BLD AUTO: 17.3 % — LOW (ref 20.5–51.1)
MCHC RBC-ENTMCNC: 29.8 PG — SIGNIFICANT CHANGE UP (ref 27–31)
MCHC RBC-ENTMCNC: 32.9 G/DL — SIGNIFICANT CHANGE UP (ref 32–37)
MCV RBC AUTO: 90.6 FL — SIGNIFICANT CHANGE UP (ref 80–94)
MONOCYTES # BLD AUTO: 0.33 K/UL — SIGNIFICANT CHANGE UP (ref 0.1–0.6)
MONOCYTES NFR BLD AUTO: 7.8 % — SIGNIFICANT CHANGE UP (ref 1.7–9.3)
NEUTROPHILS # BLD AUTO: 2.99 K/UL — SIGNIFICANT CHANGE UP (ref 1.4–6.5)
NEUTROPHILS NFR BLD AUTO: 70.9 % — SIGNIFICANT CHANGE UP (ref 42.2–75.2)
NRBC # BLD: 0 /100 WBCS — SIGNIFICANT CHANGE UP (ref 0–0)
PLATELET # BLD AUTO: 234 K/UL — SIGNIFICANT CHANGE UP (ref 130–400)
POTASSIUM SERPL-MCNC: 4.7 MMOL/L — SIGNIFICANT CHANGE UP (ref 3.5–5)
POTASSIUM SERPL-SCNC: 4.7 MMOL/L — SIGNIFICANT CHANGE UP (ref 3.5–5)
RBC # BLD: 3.42 M/UL — LOW (ref 4.7–6.1)
RBC # FLD: 12 % — SIGNIFICANT CHANGE UP (ref 11.5–14.5)
SODIUM SERPL-SCNC: 135 MMOL/L — SIGNIFICANT CHANGE UP (ref 135–146)
WBC # BLD: 4.22 K/UL — LOW (ref 4.8–10.8)
WBC # FLD AUTO: 4.22 K/UL — LOW (ref 4.8–10.8)

## 2019-07-30 PROCEDURE — 71045 X-RAY EXAM CHEST 1 VIEW: CPT | Mod: 26

## 2019-07-30 PROCEDURE — 99238 HOSP IP/OBS DSCHRG MGMT 30/<: CPT

## 2019-07-30 RX ORDER — GABAPENTIN 400 MG/1
1 CAPSULE ORAL
Qty: 0 | Refills: 0 | DISCHARGE

## 2019-07-30 RX ORDER — AMLODIPINE BESYLATE 2.5 MG/1
1 TABLET ORAL
Qty: 0 | Refills: 0 | DISCHARGE

## 2019-07-30 RX ORDER — METFORMIN HYDROCHLORIDE 850 MG/1
1 TABLET ORAL
Qty: 0 | Refills: 0 | DISCHARGE

## 2019-07-30 RX ORDER — IBUPROFEN 200 MG
1 TABLET ORAL
Qty: 0 | Refills: 0 | DISCHARGE

## 2019-07-30 RX ORDER — VALSARTAN 80 MG/1
1 TABLET ORAL
Qty: 0 | Refills: 0 | DISCHARGE

## 2019-07-30 RX ADMIN — METHADONE HYDROCHLORIDE 80 MILLIGRAM(S): 40 TABLET ORAL at 11:36

## 2019-07-30 NOTE — DISCHARGE NOTE PROVIDER - CARE PROVIDERS DIRECT ADDRESSES
[email or web address]

## 2019-07-30 NOTE — PROGRESS NOTE ADULT - SUBJECTIVE AND OBJECTIVE BOX
No overnight events.  SUBJECTIVE:   NO complaints and patient says he's ready to go home    *********************** VITALS ******************************************  Vital Signs Last 24 Hrs  T(C): 36.2 (30 Jul 2019 05:04), Max: 37.1 (29 Jul 2019 16:18)  T(F): 97.2 (30 Jul 2019 05:04), Max: 98.7 (29 Jul 2019 16:18)  HR: 72 (30 Jul 2019 05:04) (62 - 72)  BP: 130/67 (30 Jul 2019 05:04) (105/70 - 132/63)  BP(mean): 91 (29 Jul 2019 19:40) (88 - 91)  RR: 18 (30 Jul 2019 05:04) (15 - 25)  SpO2: 100% (29 Jul 2019 19:40) (95% - 100%)    ******************************** PHYSICAL EXAM:**************************************************  GENERAL:  NAD, obese    PSYCH: no agitation     HEENT:   EOMI     NERVOUS SYSTEM:  Alert & Oriented X3     PULMONARY:  breathing comfortably    CARDIOVASCULAR:  RRR       ABDOMEN: Soft, NT, ND, obese    EXTREMITIES:  warm and well perfused                               10.2   4.22  )-----------( 234      ( 30 Jul 2019 07:01 )             31.0       07-30    135  |  97<L>  |  13  ----------------------------<  100<H>  4.7   |  25  |  0.9    Ca    8.9      30 Jul 2019 07:01        -Creatinine, Serum: 1.1 mg/dL (07.29.19 @ 04:30)<<2.1 mg/dL (07.28.19 @ 04:30)<< 3.5 mg/dL (07.27.19 @ 16:55)<< 7.1: Critical value: mg/dL (07.26.19 @ 23:30)  -CT head -ve  -EEG -ve,   -neurocheck every 6 hours  -folate, TSH and vitamin b12 all NL    - TTE on 7/28 :LV Ejection Fraction by Gordon's Method with a biplane EF of 66 %.

## 2019-07-30 NOTE — DISCHARGE NOTE NURSING/CASE MANAGEMENT/SOCIAL WORK - NSDCDPATPORTLINK_GEN_ALL_CORE
You can access the [facility] Patient Portal, offered by [facility], by registering with the following website: [email or web address]

## 2019-07-30 NOTE — PROGRESS NOTE ADULT - SUBJECTIVE AND OBJECTIVE BOX
TRAVIS LEYVA  58y, Male      OVERNIGHT EVENTS:    no fevers, comfortable, has no complaints    VITALS:  T(F): 97.6, Max: 98.7 (19 @ 16:18)  HR: 74  BP: 126/62  RR: 18Vital Signs Last 24 Hrs  T(C): 36.4 (2019 13:42), Max: 37.1 (2019 16:18)  T(F): 97.6 (2019 13:42), Max: 98.7 (2019 16:18)  HR: 74 (2019 13:42) (62 - 74)  BP: 126/62 (2019 13:42) (120/67 - 132/63)  BP(mean): 91 (2019 19:40) (88 - 91)  RR: 18 (2019 13:42) (15 - 20)  SpO2: 100% (2019 19:40) (97% - 100%)    TESTS & MEASUREMENTS:                        10.2   4.22  )-----------( 234      ( 2019 07:01 )             31.0         135  |  97<L>  |  13  ----------------------------<  100<H>  4.7   |  25  |  0.9    Ca    8.9      2019 07:01          Culture - Urine (collected 19 @ 16:43)  Source: .Urine Clean Catch (Midstream)  Final Report (19 @ 16:20):    No growth    Culture - Urine (collected 19 @ 01:30)  Source: .Urine Catheterized  Final Report (19 @ 13:22):    No growth    Culture - Blood (collected 19 @ 01:02)  Source: .Blood Blood  Preliminary Report (19 @ 07:00):    No growth to date.      Urinalysis Basic - ( 2019 16:43 )    Color: Light Yellow / Appearance: Clear / S.021 / pH: x  Gluc: x / Ketone: Negative  / Bili: Negative / Urobili: <2 mg/dL   Blood: x / Protein: Trace / Nitrite: Negative   Leuk Esterase: Small / RBC: 8 /HPF / WBC 4 /HPF   Sq Epi: x / Non Sq Epi: 1 /HPF / Bacteria: Negative          RADIOLOGY & ADDITIONAL TESTS:    ANTIBIOTICS:

## 2019-07-30 NOTE — PROGRESS NOTE ADULT - ASSESSMENT
58 year old man with history of HTN, DLD and chronic back pain on methadone presented to ED for confusion/ was hypotensive/ s/p IVF/ on low dose levophed/ no source of infection/ no fever/ no WBC/ no neck stiffness however Acute renal failure/ on NSAIDS/ gabapentin, doubt PE. Patient appears oriented and his cognitive functions have returned back to normal.	    #Altered Mental Status, likely multifactorial causes, now resolved  Do not suspect encephalopathy today  urine toxi reveals methadone, pt has been taking it for back pain  -MILAN sec to possible gabapentin toxicity which has improved    #Hypotension Shock possibly due to gabapentin  - Blood pressure has improved   - Levophed discontinued  -UA and blood Cx negative   no sources of infection so no ABX necessary    #MILAN   -Cr normalized  - ATN from medications (NSAIDs) vs. pre- renal   -hold ace inh, NSAIDs and diuretics   -US kidney and bladder -ve   resolved         #Progress Note Handoff    Pending (specify):  none    Family discussion: [id number]/[id number]    Disposition: Home__x_/SNF___/Other________/Unknown at this time________   PT eval.  possible DC hm today

## 2019-07-30 NOTE — DISCHARGE NOTE PROVIDER - PROVIDER TOKENS
PROVIDER:[TOKEN:[[id number]],FOLLOWUP:[1 week]] PROVIDER:[TOKEN:[[id number]],FOLLOWUP:[1 week]],PROVIDER:[TOKEN:[[id number]],FOLLOWUP:[2 weeks]],PROVIDER:[TOKEN:[[id number]],FOLLOWUP:[2 weeks]]

## 2019-07-30 NOTE — DISCHARGE NOTE PROVIDER - CARE PROVIDER_API CALL
Dora Leavitt)  Family Medicine  [address] [place] [address]  Phone: [phone]  Fax: [phone]  Follow Up Time: 1 week Dora Leavitt)  Family Medicine  [address] [place] [address]  Phone: [phone]  Fax: [phone]  Follow Up Time: 1 week    Arnoldo Hernandez)  Neuromuscular Medicine  [address] [place] [address], [address]  [place], [place] [id number]  Phone: [phone]  Fax: [phone]  Follow Up Time: 2 weeks    Beth Miranda)  Internal Medicine; Nephrology  [address] [place] [address]  Phone: [phone]  Fax: [phone]  Follow Up Time: 2 weeks

## 2019-07-30 NOTE — DISCHARGE NOTE PROVIDER - HOSPITAL COURSE
58 year old man with history of HTN, DLD and chronic back pain on methadone presented to ED for confusion/ was hypotensive/ s/p IVF/ on low dose levophed/ no source of infection/ no fever/ no WBC/ no neck stiffness however Acute renal failure/ on NSAIDS/ gabapentin, doubt PE. Patient appears oriented and his cognitive functions have returned back to normal.	        #Altered Mental Status, likely multifactorial causes, now resolved    Do not suspect encephalopathy today    urine toxi reveals methadone, pt has been taking it for back pain    -MILAN sec to possible gabapentin toxicity which has improved        #Hypotension Shock possibly due to gabapentin    - Blood pressure has improved     - Levophed discontinued    -UA and blood Cx negative     no sources of infection so no ABX necessary        #MILAN     -Cr normalized    - ATN from medications (NSAIDs) vs. pre- renal     -hold ace inh, NSAIDs and diuretics     -US kidney and bladder -ve     resolved

## 2019-07-30 NOTE — PROGRESS NOTE ADULT - ASSESSMENT
· Assessment		   58 year old ender with history of HTN, DLD and chronic back pain on methadone presented to ED for confusion. he called his daughter at around 4 pm telling her that he feels confused, she went there and noticed that he had tremors, could not hold anything in his right hand (everything is dropping), had slurred speech, and could not remember simple daily things he used to.   History is also relevant for a fall 2 days ago that was unwitnessed and was complicated by right ankle fracture currently in a cast.  On admission see no evidence of infection.   No CNS infection as the pt is presently alert, responsive and has no complaints  Fevers from last pm possibly related to his left femoral catheter   No PNA  No pyuria  No intraabdominal pathology  Initially had MSOF which has resolved.  Acute renal failure with normalization of creatinine  BCx 7/27 NG  UCx NG  WBC4.7    IMPRESSION:  No ongoing infectious process  No PNA  No CNS infection  Clinically stable    RECOMMENDATIONS:  Maintain off ABx  Recall prn please

## 2019-07-30 NOTE — DISCHARGE NOTE PROVIDER - NSDCFUSCHEDAPPT_GEN_ALL_CORE_FT
TRAVIS LEYVA ; 10/16/2019 ; [facility] [address] TRAVIS LEYVA ; 10/16/2019 ; [facility] [address]

## 2019-07-30 NOTE — DISCHARGE NOTE PROVIDER - NSDCCPCAREPLAN_GEN_ALL_CORE_FT
PRINCIPAL DISCHARGE DIAGNOSIS  Diagnosis: MILAN (acute kidney injury)  Assessment and Plan of Treatment: Now resolved.  stopped multiple medications including gabapentin, valsartan, ibuprofen.  do not restart these.  stopped blood pressure medications and diuretics (amlodipine and HCTZ) as well and pt should not retake these at this time.  needs f/u with PCP in 1-2 weeks.      SECONDARY DISCHARGE DIAGNOSES  Diagnosis: Altered mental state  Assessment and Plan of Treatment:     Diagnosis: Hypotension  Assessment and Plan of Treatment:

## 2019-07-30 NOTE — DISCHARGE NOTE PROVIDER - NSDCFUADDAPPT_GEN_ALL_CORE_FT
Very important for you to call your PMD/PCP and Neurologist and/or Nephrologist  for follow up appointments.

## 2019-08-01 LAB
CULTURE RESULTS: SIGNIFICANT CHANGE UP
DRUG SCREEN, SERUM: ABNORMAL
EDDP UR CFM-MCNC: 7327 NG/MG CREAT — SIGNIFICANT CHANGE UP
METHADONE UR CFM-MCNC: 3164 NG/MG CREAT — SIGNIFICANT CHANGE UP
METHADONE UR CFM-MCNC: ABNORMAL
SPECIMEN SOURCE: SIGNIFICANT CHANGE UP

## 2019-08-02 LAB
CORTICOSTEROID BINDING GLOBULIN RESULT: 2.3 MG/DL — SIGNIFICANT CHANGE UP
CORTIS F/TOTAL MFR SERPL: 37 % — SIGNIFICANT CHANGE UP
CORTIS SERPL-MCNC: 24 UG/DL — HIGH
CORTISOL, FREE RESULT: 8.8 UG/DL — HIGH

## 2019-08-04 LAB
CORTICOSTEROID BINDING GLOBULIN RESULT: 2.2 MG/DL — SIGNIFICANT CHANGE UP
CORTIS F/TOTAL MFR SERPL: 5.1 % — SIGNIFICANT CHANGE UP
CORTIS SERPL-MCNC: 8.9 UG/DL — SIGNIFICANT CHANGE UP
CORTISOL, FREE RESULT: 0.46 UG/DL — SIGNIFICANT CHANGE UP

## 2019-08-06 DIAGNOSIS — E87.2 ACIDOSIS: ICD-10-CM

## 2019-08-06 DIAGNOSIS — G93.41 METABOLIC ENCEPHALOPATHY: ICD-10-CM

## 2019-08-06 DIAGNOSIS — R57.9 SHOCK, UNSPECIFIED: ICD-10-CM

## 2019-08-06 DIAGNOSIS — Z79.84 LONG TERM (CURRENT) USE OF ORAL HYPOGLYCEMIC DRUGS: ICD-10-CM

## 2019-08-06 DIAGNOSIS — T42.6X5A ADVERSE EFFECT OF OTHER ANTIEPILEPTIC AND SEDATIVE-HYPNOTIC DRUGS, INITIAL ENCOUNTER: ICD-10-CM

## 2019-08-06 DIAGNOSIS — Z98.890 OTHER SPECIFIED POSTPROCEDURAL STATES: ICD-10-CM

## 2019-08-06 DIAGNOSIS — R00.1 BRADYCARDIA, UNSPECIFIED: ICD-10-CM

## 2019-08-06 DIAGNOSIS — R47.1 DYSARTHRIA AND ANARTHRIA: ICD-10-CM

## 2019-08-06 DIAGNOSIS — E11.9 TYPE 2 DIABETES MELLITUS WITHOUT COMPLICATIONS: ICD-10-CM

## 2019-08-06 DIAGNOSIS — T40.3X5A ADVERSE EFFECT OF METHADONE, INITIAL ENCOUNTER: ICD-10-CM

## 2019-08-06 DIAGNOSIS — R41.82 ALTERED MENTAL STATUS, UNSPECIFIED: ICD-10-CM

## 2019-08-06 DIAGNOSIS — S82.891D OTHER FRACTURE OF RIGHT LOWER LEG, SUBSEQUENT ENCOUNTER FOR CLOSED FRACTURE WITH ROUTINE HEALING: ICD-10-CM

## 2019-08-06 DIAGNOSIS — I10 ESSENTIAL (PRIMARY) HYPERTENSION: ICD-10-CM

## 2019-08-06 DIAGNOSIS — T39.395A ADVERSE EFFECT OF OTHER NONSTEROIDAL ANTI-INFLAMMATORY DRUGS [NSAID], INITIAL ENCOUNTER: ICD-10-CM

## 2019-08-06 DIAGNOSIS — R27.8 OTHER LACK OF COORDINATION: ICD-10-CM

## 2019-08-06 DIAGNOSIS — R29.701 NIHSS SCORE 1: ICD-10-CM

## 2019-08-06 DIAGNOSIS — R33.9 RETENTION OF URINE, UNSPECIFIED: ICD-10-CM

## 2019-08-06 DIAGNOSIS — G89.29 OTHER CHRONIC PAIN: ICD-10-CM

## 2019-08-06 DIAGNOSIS — N17.0 ACUTE KIDNEY FAILURE WITH TUBULAR NECROSIS: ICD-10-CM
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
## 2019-08-06 DIAGNOSIS — M54.9 DORSALGIA, UNSPECIFIED: ICD-10-CM

## 2019-08-06 DIAGNOSIS — E78.00 PURE HYPERCHOLESTEROLEMIA, UNSPECIFIED: ICD-10-CM

## 2019-09-06 PROBLEM — E78.00 PURE HYPERCHOLESTEROLEMIA, UNSPECIFIED: Chronic | Status: ACTIVE | Noted: 2019-07-26

## 2019-09-06 PROBLEM — E11.9 TYPE 2 DIABETES MELLITUS WITHOUT COMPLICATIONS: Chronic | Status: ACTIVE | Noted: 2019-07-26

## 2019-09-06 PROBLEM — I10 ESSENTIAL (PRIMARY) HYPERTENSION: Chronic | Status: ACTIVE | Noted: 2019-07-26

## 2019-09-06 PROBLEM — S82.899A OTHER FRACTURE OF UNSPECIFIED LOWER LEG, INITIAL ENCOUNTER FOR CLOSED FRACTURE: Chronic | Status: ACTIVE | Noted: 2019-07-26

## 2019-09-09 ENCOUNTER — OUTPATIENT (OUTPATIENT)
Dept: OUTPATIENT SERVICES | Facility: HOSPITAL | Age: 59
LOS: 1 days | Discharge: HOME | End: 2019-09-09
Payer: COMMERCIAL

## 2019-09-09 DIAGNOSIS — R42 DIZZINESS AND GIDDINESS: ICD-10-CM

## 2019-09-09 DIAGNOSIS — R52 PAIN, UNSPECIFIED: ICD-10-CM

## 2019-09-09 DIAGNOSIS — Z98.890 OTHER SPECIFIED POSTPROCEDURAL STATES: Chronic | ICD-10-CM

## 2019-09-09 DIAGNOSIS — R20.0 ANESTHESIA OF SKIN: ICD-10-CM

## 2019-09-09 PROCEDURE — 70551 MRI BRAIN STEM W/O DYE: CPT | Mod: 26

## 2019-10-16 ENCOUNTER — APPOINTMENT (OUTPATIENT)
Dept: CARDIOLOGY | Facility: CLINIC | Age: 59
End: 2019-10-16

## 2019-11-18 ENCOUNTER — OUTPATIENT (OUTPATIENT)
Dept: OUTPATIENT SERVICES | Facility: HOSPITAL | Age: 59
LOS: 1 days | Discharge: HOME | End: 2019-11-18

## 2019-11-18 DIAGNOSIS — Z00.8 ENCOUNTER FOR OTHER GENERAL EXAMINATION: ICD-10-CM

## 2019-11-18 DIAGNOSIS — Z98.890 OTHER SPECIFIED POSTPROCEDURAL STATES: Chronic | ICD-10-CM

## 2019-11-18 LAB
ALBUMIN SERPL ELPH-MCNC: 4.3 G/DL — SIGNIFICANT CHANGE UP (ref 3.5–5.2)
ALP SERPL-CCNC: 93 U/L — SIGNIFICANT CHANGE UP (ref 30–115)
ALT FLD-CCNC: 15 U/L — SIGNIFICANT CHANGE UP (ref 0–41)
ANION GAP SERPL CALC-SCNC: 17 MMOL/L — HIGH (ref 7–14)
APPEARANCE UR: CLEAR — SIGNIFICANT CHANGE UP
AST SERPL-CCNC: 14 U/L — SIGNIFICANT CHANGE UP (ref 0–41)
BILIRUB SERPL-MCNC: 0.2 MG/DL — SIGNIFICANT CHANGE UP (ref 0.2–1.2)
BILIRUB UR-MCNC: NEGATIVE — SIGNIFICANT CHANGE UP
BUN SERPL-MCNC: 18 MG/DL — SIGNIFICANT CHANGE UP (ref 10–20)
CALCIUM SERPL-MCNC: 8.6 MG/DL — SIGNIFICANT CHANGE UP (ref 8.5–10.1)
CHLORIDE SERPL-SCNC: 101 MMOL/L — SIGNIFICANT CHANGE UP (ref 98–110)
CHOLEST SERPL-MCNC: 155 MG/DL — SIGNIFICANT CHANGE UP (ref 100–200)
CO2 SERPL-SCNC: 23 MMOL/L — SIGNIFICANT CHANGE UP (ref 17–32)
COLOR SPEC: YELLOW — SIGNIFICANT CHANGE UP
CREAT SERPL-MCNC: 0.8 MG/DL — SIGNIFICANT CHANGE UP (ref 0.7–1.5)
DIFF PNL FLD: NEGATIVE — SIGNIFICANT CHANGE UP
ESTIMATED AVERAGE GLUCOSE: 117 MG/DL — HIGH (ref 68–114)
GLUCOSE SERPL-MCNC: 126 MG/DL — HIGH (ref 70–99)
GLUCOSE UR QL: NEGATIVE MG/DL — SIGNIFICANT CHANGE UP
HBA1C BLD-MCNC: 5.7 % — HIGH (ref 4–5.6)
HCT VFR BLD CALC: 38.4 % — LOW (ref 42–52)
HDLC SERPL-MCNC: 50 MG/DL — SIGNIFICANT CHANGE UP
HGB BLD-MCNC: 12.1 G/DL — LOW (ref 14–18)
KETONES UR-MCNC: NEGATIVE — SIGNIFICANT CHANGE UP
LEUKOCYTE ESTERASE UR-ACNC: NEGATIVE — SIGNIFICANT CHANGE UP
LIPID PNL WITH DIRECT LDL SERPL: 91 MG/DL — SIGNIFICANT CHANGE UP (ref 4–129)
MAGNESIUM SERPL-MCNC: 2 MG/DL — SIGNIFICANT CHANGE UP (ref 1.8–2.4)
MCHC RBC-ENTMCNC: 30 PG — SIGNIFICANT CHANGE UP (ref 27–31)
MCHC RBC-ENTMCNC: 31.5 G/DL — LOW (ref 32–37)
MCV RBC AUTO: 95.3 FL — HIGH (ref 80–94)
NITRITE UR-MCNC: NEGATIVE — SIGNIFICANT CHANGE UP
NRBC # BLD: 0 /100 WBCS — SIGNIFICANT CHANGE UP (ref 0–0)
PH UR: 6.5 — SIGNIFICANT CHANGE UP (ref 5–8)
PLATELET # BLD AUTO: 251 K/UL — SIGNIFICANT CHANGE UP (ref 130–400)
POTASSIUM SERPL-MCNC: 4.2 MMOL/L — SIGNIFICANT CHANGE UP (ref 3.5–5)
POTASSIUM SERPL-SCNC: 4.2 MMOL/L — SIGNIFICANT CHANGE UP (ref 3.5–5)
PROT SERPL-MCNC: 7.2 G/DL — SIGNIFICANT CHANGE UP (ref 6–8)
PROT UR-MCNC: NEGATIVE MG/DL — SIGNIFICANT CHANGE UP
RBC # BLD: 4.03 M/UL — LOW (ref 4.7–6.1)
RBC # FLD: 12.9 % — SIGNIFICANT CHANGE UP (ref 11.5–14.5)
SODIUM SERPL-SCNC: 141 MMOL/L — SIGNIFICANT CHANGE UP (ref 135–146)
SP GR SPEC: 1.02 — SIGNIFICANT CHANGE UP (ref 1.01–1.03)
TOTAL CHOLESTEROL/HDL RATIO MEASUREMENT: 3.1 RATIO — LOW (ref 4–5.5)
TRIGL SERPL-MCNC: 100 MG/DL — SIGNIFICANT CHANGE UP (ref 10–149)
UROBILINOGEN FLD QL: 0.2 MG/DL — SIGNIFICANT CHANGE UP (ref 0.2–0.2)
WBC # BLD: 5.88 K/UL — SIGNIFICANT CHANGE UP (ref 4.8–10.8)
WBC # FLD AUTO: 5.88 K/UL — SIGNIFICANT CHANGE UP (ref 4.8–10.8)

## 2019-11-19 LAB
HAV IGM SER-ACNC: SIGNIFICANT CHANGE UP
HBV CORE IGM SER-ACNC: SIGNIFICANT CHANGE UP
HBV SURFACE AG SER-ACNC: SIGNIFICANT CHANGE UP
HCV AB S/CO SERPL IA: 0.19 S/CO — SIGNIFICANT CHANGE UP (ref 0–0.99)
HCV AB SERPL-IMP: SIGNIFICANT CHANGE UP
T PALLIDUM AB TITR SER: NEGATIVE — SIGNIFICANT CHANGE UP

## 2019-12-05 ENCOUNTER — OUTPATIENT (OUTPATIENT)
Dept: OUTPATIENT SERVICES | Facility: HOSPITAL | Age: 59
LOS: 1 days | Discharge: HOME | End: 2019-12-05

## 2019-12-05 DIAGNOSIS — Z98.890 OTHER SPECIFIED POSTPROCEDURAL STATES: Chronic | ICD-10-CM

## 2019-12-05 DIAGNOSIS — I49.9 CARDIAC ARRHYTHMIA, UNSPECIFIED: ICD-10-CM

## 2019-12-19 ENCOUNTER — OUTPATIENT (OUTPATIENT)
Dept: OUTPATIENT SERVICES | Facility: HOSPITAL | Age: 59
LOS: 1 days | Discharge: HOME | End: 2019-12-19

## 2019-12-19 DIAGNOSIS — Z98.890 OTHER SPECIFIED POSTPROCEDURAL STATES: Chronic | ICD-10-CM

## 2019-12-19 DIAGNOSIS — I49.9 CARDIAC ARRHYTHMIA, UNSPECIFIED: ICD-10-CM

## 2020-02-10 ENCOUNTER — APPOINTMENT (OUTPATIENT)
Dept: CARDIOLOGY | Facility: CLINIC | Age: 60
End: 2020-02-10
Payer: COMMERCIAL

## 2020-02-10 PROCEDURE — 93000 ELECTROCARDIOGRAM COMPLETE: CPT

## 2020-02-10 PROCEDURE — 99213 OFFICE O/P EST LOW 20 MIN: CPT

## 2020-06-24 ENCOUNTER — APPOINTMENT (OUTPATIENT)
Dept: CARDIOLOGY | Facility: CLINIC | Age: 60
End: 2020-06-24

## 2020-11-11 ENCOUNTER — OUTPATIENT (OUTPATIENT)
Dept: OUTPATIENT SERVICES | Facility: HOSPITAL | Age: 60
LOS: 1 days | Discharge: HOME | End: 2020-11-11

## 2020-11-11 DIAGNOSIS — Z00.8 ENCOUNTER FOR OTHER GENERAL EXAMINATION: ICD-10-CM

## 2020-11-11 DIAGNOSIS — Z98.890 OTHER SPECIFIED POSTPROCEDURAL STATES: Chronic | ICD-10-CM

## 2020-11-16 LAB
A1C WITH ESTIMATED AVERAGE GLUCOSE RESULT: 5.5 % — SIGNIFICANT CHANGE UP (ref 4–5.6)
ALBUMIN SERPL ELPH-MCNC: 5 G/DL — SIGNIFICANT CHANGE UP (ref 3.5–5.2)
ALP SERPL-CCNC: 58 U/L — SIGNIFICANT CHANGE UP (ref 30–115)
ALT FLD-CCNC: 11 U/L — SIGNIFICANT CHANGE UP (ref 0–41)
ANION GAP SERPL CALC-SCNC: 16 MMOL/L — HIGH (ref 7–14)
APPEARANCE UR: CLEAR — SIGNIFICANT CHANGE UP
AST SERPL-CCNC: 15 U/L — SIGNIFICANT CHANGE UP (ref 0–41)
BILIRUB SERPL-MCNC: 0.3 MG/DL — SIGNIFICANT CHANGE UP (ref 0.2–1.2)
BILIRUB UR-MCNC: NEGATIVE — SIGNIFICANT CHANGE UP
BUN SERPL-MCNC: 29 MG/DL — HIGH (ref 10–20)
CALCIUM SERPL-MCNC: 10.1 MG/DL — SIGNIFICANT CHANGE UP (ref 8.5–10.1)
CHLORIDE SERPL-SCNC: 102 MMOL/L — SIGNIFICANT CHANGE UP (ref 98–110)
CHOLEST SERPL-MCNC: 238 MG/DL — HIGH
CO2 SERPL-SCNC: 18 MMOL/L — SIGNIFICANT CHANGE UP (ref 17–32)
COLOR SPEC: YELLOW — SIGNIFICANT CHANGE UP
CREAT SERPL-MCNC: 1 MG/DL — SIGNIFICANT CHANGE UP (ref 0.7–1.5)
DIFF PNL FLD: NEGATIVE — SIGNIFICANT CHANGE UP
ESTIMATED AVERAGE GLUCOSE: 111 MG/DL — SIGNIFICANT CHANGE UP (ref 68–114)
GLUCOSE SERPL-MCNC: 121 MG/DL — HIGH (ref 70–99)
GLUCOSE UR QL: NEGATIVE MG/DL — SIGNIFICANT CHANGE UP
HCT VFR BLD CALC: 40.8 % — LOW (ref 42–52)
HDLC SERPL-MCNC: 53 MG/DL — SIGNIFICANT CHANGE UP
HGB BLD-MCNC: 13.6 G/DL — LOW (ref 14–18)
KETONES UR-MCNC: NEGATIVE — SIGNIFICANT CHANGE UP
LEUKOCYTE ESTERASE UR-ACNC: NEGATIVE — SIGNIFICANT CHANGE UP
LIPID PNL WITH DIRECT LDL SERPL: 157 MG/DL — HIGH
MAGNESIUM SERPL-MCNC: 1.8 MG/DL — SIGNIFICANT CHANGE UP (ref 1.8–2.4)
MCHC RBC-ENTMCNC: 31.2 PG — HIGH (ref 27–31)
MCHC RBC-ENTMCNC: 33.3 G/DL — SIGNIFICANT CHANGE UP (ref 32–37)
MCV RBC AUTO: 93.6 FL — SIGNIFICANT CHANGE UP (ref 80–94)
NITRITE UR-MCNC: NEGATIVE — SIGNIFICANT CHANGE UP
NON HDL CHOLESTEROL: 185 MG/DL — HIGH
NRBC # BLD: 0 /100 WBCS — SIGNIFICANT CHANGE UP (ref 0–0)
PH UR: 6 — SIGNIFICANT CHANGE UP (ref 5–8)
PLATELET # BLD AUTO: 228 K/UL — SIGNIFICANT CHANGE UP (ref 130–400)
POTASSIUM SERPL-MCNC: 4.6 MMOL/L — SIGNIFICANT CHANGE UP (ref 3.5–5)
POTASSIUM SERPL-SCNC: 4.6 MMOL/L — SIGNIFICANT CHANGE UP (ref 3.5–5)
PROT SERPL-MCNC: 7.8 G/DL — SIGNIFICANT CHANGE UP (ref 6–8)
PROT UR-MCNC: NEGATIVE MG/DL — SIGNIFICANT CHANGE UP
RBC # BLD: 4.36 M/UL — LOW (ref 4.7–6.1)
RBC # FLD: 12.4 % — SIGNIFICANT CHANGE UP (ref 11.5–14.5)
SODIUM SERPL-SCNC: 136 MMOL/L — SIGNIFICANT CHANGE UP (ref 135–146)
SP GR SPEC: 1.02 — SIGNIFICANT CHANGE UP (ref 1.01–1.03)
TRIGL SERPL-MCNC: 192 MG/DL — HIGH
UROBILINOGEN FLD QL: 0.2 MG/DL — SIGNIFICANT CHANGE UP (ref 0.2–0.2)
WBC # BLD: 7.18 K/UL — SIGNIFICANT CHANGE UP (ref 4.8–10.8)
WBC # FLD AUTO: 7.18 K/UL — SIGNIFICANT CHANGE UP (ref 4.8–10.8)

## 2020-11-17 LAB
HAV IGM SER-ACNC: SIGNIFICANT CHANGE UP
HBV CORE IGM SER-ACNC: SIGNIFICANT CHANGE UP
HBV SURFACE AG SER-ACNC: SIGNIFICANT CHANGE UP
HCV AB S/CO SERPL IA: 0.1 S/CO — SIGNIFICANT CHANGE UP (ref 0–0.99)
HCV AB SERPL-IMP: SIGNIFICANT CHANGE UP
T PALLIDUM AB TITR SER: NEGATIVE — SIGNIFICANT CHANGE UP

## 2020-11-18 LAB
GAMMA INTERFERON BACKGROUND BLD IA-ACNC: 0.01 IU/ML — SIGNIFICANT CHANGE UP
M TB IFN-G BLD-IMP: NEGATIVE — SIGNIFICANT CHANGE UP
M TB IFN-G CD4+ BCKGRND COR BLD-ACNC: 0 IU/ML — SIGNIFICANT CHANGE UP
M TB IFN-G CD4+CD8+ BCKGRND COR BLD-ACNC: 0 IU/ML — SIGNIFICANT CHANGE UP
QUANT TB PLUS MITOGEN MINUS NIL: 5.27 IU/ML — SIGNIFICANT CHANGE UP

## 2020-12-04 ENCOUNTER — OUTPATIENT (OUTPATIENT)
Dept: OUTPATIENT SERVICES | Facility: HOSPITAL | Age: 60
LOS: 1 days | Discharge: HOME | End: 2020-12-04

## 2020-12-04 DIAGNOSIS — Z98.890 OTHER SPECIFIED POSTPROCEDURAL STATES: Chronic | ICD-10-CM

## 2020-12-04 DIAGNOSIS — I49.9 CARDIAC ARRHYTHMIA, UNSPECIFIED: ICD-10-CM

## 2021-02-25 NOTE — ASSESSMENT
[FreeTextEntry1] : LVH on echo HTN NIDDM (-) thall  long QT on methadone no ventricular arrtyhmia except ioslated PVC on holter avoid meds and SADS site given to him   Plan QTC stable at 461 today 428 qtc normal today  qt400 in limb leads longer in lateral leads no palp aymptomatoc lost good deal of weight will f/u ekg since dispersion if still consider holter or lowering dose  continue dose of methadone needs lipid profile

## 2021-02-25 NOTE — PHYSICAL EXAM
[General Appearance - Well Developed] : well developed [Normal Appearance] : normal appearance [Well Groomed] : well groomed [General Appearance - Well Nourished] : well nourished [No Deformities] : no deformities [General Appearance - In No Acute Distress] : no acute distress [Normal Conjunctiva] : the conjunctiva exhibited no abnormalities [Eyelids - No Xanthelasma] : the eyelids demonstrated no xanthelasmas [Normal Oral Mucosa] : normal oral mucosa [No Oral Pallor] : no oral pallor [No Oral Cyanosis] : no oral cyanosis [FreeTextEntry1] : No JVD [] : no respiratory distress [Auscultation Breath Sounds / Voice Sounds] : lungs were clear to auscultation bilaterally [Heart Rate And Rhythm] : heart rate and rhythm were normal [Murmurs] : no murmurs present [Arterial Pulses Normal] : the arterial pulses were normal [Edema] : no peripheral edema present [Bowel Sounds] : normal bowel sounds [Abdomen Soft] : soft [Nail Clubbing] : no clubbing of the fingernails [Cyanosis, Localized] : no localized cyanosis [No Xanthoma] : no  xanthoma was observed [Oriented To Time, Place, And Person] : oriented to person, place, and time

## 2021-03-01 ENCOUNTER — APPOINTMENT (OUTPATIENT)
Dept: CARDIOLOGY | Facility: CLINIC | Age: 61
End: 2021-03-01
Payer: COMMERCIAL

## 2021-06-14 ENCOUNTER — APPOINTMENT (OUTPATIENT)
Dept: CARDIOLOGY | Facility: CLINIC | Age: 61
End: 2021-06-14
Payer: COMMERCIAL

## 2021-10-19 ENCOUNTER — OUTPATIENT (OUTPATIENT)
Dept: OUTPATIENT SERVICES | Facility: HOSPITAL | Age: 61
LOS: 1 days | Discharge: HOME | End: 2021-10-19

## 2021-10-19 DIAGNOSIS — Z00.8 ENCOUNTER FOR OTHER GENERAL EXAMINATION: ICD-10-CM

## 2021-10-19 DIAGNOSIS — Z98.890 OTHER SPECIFIED POSTPROCEDURAL STATES: Chronic | ICD-10-CM

## 2021-11-06 LAB
A1C WITH ESTIMATED AVERAGE GLUCOSE RESULT: 5.5 % — SIGNIFICANT CHANGE UP (ref 4–5.6)
ALBUMIN SERPL ELPH-MCNC: 4.9 G/DL — SIGNIFICANT CHANGE UP (ref 3.5–5.2)
ALP SERPL-CCNC: 79 U/L — SIGNIFICANT CHANGE UP (ref 30–115)
ALT FLD-CCNC: 16 U/L — SIGNIFICANT CHANGE UP (ref 0–41)
ANION GAP SERPL CALC-SCNC: 15 MMOL/L — HIGH (ref 7–14)
APPEARANCE UR: CLEAR — SIGNIFICANT CHANGE UP
AST SERPL-CCNC: 17 U/L — SIGNIFICANT CHANGE UP (ref 0–41)
BILIRUB SERPL-MCNC: 0.4 MG/DL — SIGNIFICANT CHANGE UP (ref 0.2–1.2)
BILIRUB UR-MCNC: NEGATIVE — SIGNIFICANT CHANGE UP
BUN SERPL-MCNC: 23 MG/DL — HIGH (ref 10–20)
CALCIUM SERPL-MCNC: 9.6 MG/DL — SIGNIFICANT CHANGE UP (ref 8.5–10.1)
CHLORIDE SERPL-SCNC: 100 MMOL/L — SIGNIFICANT CHANGE UP (ref 98–110)
CHOLEST SERPL-MCNC: 237 MG/DL — HIGH
CO2 SERPL-SCNC: 24 MMOL/L — SIGNIFICANT CHANGE UP (ref 17–32)
COLOR SPEC: YELLOW — SIGNIFICANT CHANGE UP
CREAT SERPL-MCNC: 1 MG/DL — SIGNIFICANT CHANGE UP (ref 0.7–1.5)
DIFF PNL FLD: NEGATIVE — SIGNIFICANT CHANGE UP
ESTIMATED AVERAGE GLUCOSE: 111 MG/DL — SIGNIFICANT CHANGE UP (ref 68–114)
GLUCOSE SERPL-MCNC: 121 MG/DL — HIGH (ref 70–99)
GLUCOSE UR QL: NEGATIVE MG/DL — SIGNIFICANT CHANGE UP
HAV IGM SER-ACNC: SIGNIFICANT CHANGE UP
HBV CORE IGM SER-ACNC: SIGNIFICANT CHANGE UP
HBV SURFACE AG SER-ACNC: SIGNIFICANT CHANGE UP
HCT VFR BLD CALC: 41.4 % — LOW (ref 42–52)
HCV AB S/CO SERPL IA: 0.15 S/CO — SIGNIFICANT CHANGE UP (ref 0–0.99)
HCV AB SERPL-IMP: SIGNIFICANT CHANGE UP
HDLC SERPL-MCNC: 49 MG/DL — SIGNIFICANT CHANGE UP
HGB BLD-MCNC: 13.4 G/DL — LOW (ref 14–18)
KETONES UR-MCNC: NEGATIVE — SIGNIFICANT CHANGE UP
LEUKOCYTE ESTERASE UR-ACNC: NEGATIVE — SIGNIFICANT CHANGE UP
LIPID PNL WITH DIRECT LDL SERPL: 162 MG/DL — HIGH
MAGNESIUM SERPL-MCNC: 1.8 MG/DL — SIGNIFICANT CHANGE UP (ref 1.8–2.4)
MCHC RBC-ENTMCNC: 31.1 PG — HIGH (ref 27–31)
MCHC RBC-ENTMCNC: 32.4 G/DL — SIGNIFICANT CHANGE UP (ref 32–37)
MCV RBC AUTO: 96.1 FL — HIGH (ref 80–94)
NITRITE UR-MCNC: NEGATIVE — SIGNIFICANT CHANGE UP
NON HDL CHOLESTEROL: 188 MG/DL — HIGH
NRBC # BLD: 0 /100 WBCS — SIGNIFICANT CHANGE UP (ref 0–0)
PH UR: 5.5 — SIGNIFICANT CHANGE UP (ref 5–8)
PLATELET # BLD AUTO: 287 K/UL — SIGNIFICANT CHANGE UP (ref 130–400)
POTASSIUM SERPL-MCNC: 4.3 MMOL/L — SIGNIFICANT CHANGE UP (ref 3.5–5)
POTASSIUM SERPL-SCNC: 4.3 MMOL/L — SIGNIFICANT CHANGE UP (ref 3.5–5)
PROT SERPL-MCNC: 8 G/DL — SIGNIFICANT CHANGE UP (ref 6–8)
PROT UR-MCNC: NEGATIVE MG/DL — SIGNIFICANT CHANGE UP
RBC # BLD: 4.31 M/UL — LOW (ref 4.7–6.1)
RBC # FLD: 12.2 % — SIGNIFICANT CHANGE UP (ref 11.5–14.5)
SODIUM SERPL-SCNC: 139 MMOL/L — SIGNIFICANT CHANGE UP (ref 135–146)
SP GR SPEC: 1.02 — SIGNIFICANT CHANGE UP (ref 1.01–1.03)
T PALLIDUM AB TITR SER: NEGATIVE — SIGNIFICANT CHANGE UP
TRIGL SERPL-MCNC: 151 MG/DL — HIGH
UROBILINOGEN FLD QL: 0.2 MG/DL — SIGNIFICANT CHANGE UP
WBC # BLD: 7.86 K/UL — SIGNIFICANT CHANGE UP (ref 4.8–10.8)
WBC # FLD AUTO: 7.86 K/UL — SIGNIFICANT CHANGE UP (ref 4.8–10.8)

## 2021-11-08 ENCOUNTER — RESULT CHARGE (OUTPATIENT)
Age: 61
End: 2021-11-08

## 2021-11-08 ENCOUNTER — APPOINTMENT (OUTPATIENT)
Dept: CARDIOLOGY | Facility: CLINIC | Age: 61
End: 2021-11-08
Payer: COMMERCIAL

## 2021-11-08 VITALS
SYSTOLIC BLOOD PRESSURE: 150 MMHG | TEMPERATURE: 98.7 F | RESPIRATION RATE: 16 BRPM | DIASTOLIC BLOOD PRESSURE: 86 MMHG | OXYGEN SATURATION: 99 % | HEIGHT: 68 IN | WEIGHT: 228 LBS | HEART RATE: 102 BPM | BODY MASS INDEX: 34.56 KG/M2

## 2021-11-08 PROCEDURE — 93000 ELECTROCARDIOGRAM COMPLETE: CPT

## 2021-11-08 PROCEDURE — 99214 OFFICE O/P EST MOD 30 MIN: CPT

## 2021-11-08 RX ORDER — METFORMIN HYDROCHLORIDE 500 MG/1
500 TABLET, COATED ORAL DAILY
Refills: 0 | Status: DISCONTINUED | COMMUNITY
End: 2021-11-08

## 2021-11-08 NOTE — ASSESSMENT
[FreeTextEntry1] : LVH on echo\par  HTN \par NIDDM   was on metrformin in past and now not and HGb1ac is 5.4 \par (-) thall\par \par   long QT on methadone no ventricular arrhythmia except isolated PVC on holter\par  avoid meds and SADS site given to him \par   QTC stable at 461 today 428 qtc normal today  qt400 in limb leads longer in lateral leads no palp asymptomatic lost good deal of weight will f/u ekg since dispersion if still consider holter or lowering dose  continue dose of \par qtc today 422\par \par stable to stay on current dose of methadone \par \par BP up today but has been stable no change in meds today \par lDL 162 ASCVD risk score is 12.3% add crestor 40 aday to get lDL approx 100 range and f/u labs

## 2021-11-08 NOTE — PHYSICAL EXAM
[General Appearance - Well Developed] : well developed [Normal Appearance] : normal appearance [Well Groomed] : well groomed [General Appearance - Well Nourished] : well nourished [No Deformities] : no deformities [General Appearance - In No Acute Distress] : no acute distress [Normal Conjunctiva] : the conjunctiva exhibited no abnormalities [Eyelids - No Xanthelasma] : the eyelids demonstrated no xanthelasmas [Normal Oral Mucosa] : normal oral mucosa [No Oral Pallor] : no oral pallor [No Oral Cyanosis] : no oral cyanosis [] : no respiratory distress [Auscultation Breath Sounds / Voice Sounds] : lungs were clear to auscultation bilaterally [Heart Rate And Rhythm] : heart rate and rhythm were normal [Murmurs] : no murmurs present [Arterial Pulses Normal] : the arterial pulses were normal [Edema] : no peripheral edema present [Bowel Sounds] : normal bowel sounds [Abdomen Soft] : soft [Nail Clubbing] : no clubbing of the fingernails [Cyanosis, Localized] : no localized cyanosis [No Xanthoma] : no  xanthoma was observed [Oriented To Time, Place, And Person] : oriented to person, place, and time [FreeTextEntry1] : No JVD

## 2021-11-09 LAB
GAMMA INTERFERON BACKGROUND BLD IA-ACNC: 0.01 IU/ML — SIGNIFICANT CHANGE UP
M TB IFN-G BLD-IMP: NEGATIVE — SIGNIFICANT CHANGE UP
M TB IFN-G CD4+ BCKGRND COR BLD-ACNC: 0 IU/ML — SIGNIFICANT CHANGE UP
M TB IFN-G CD4+CD8+ BCKGRND COR BLD-ACNC: 0.01 IU/ML — SIGNIFICANT CHANGE UP
QUANT TB PLUS MITOGEN MINUS NIL: 8.47 IU/ML — SIGNIFICANT CHANGE UP

## 2022-10-18 ENCOUNTER — OUTPATIENT (OUTPATIENT)
Dept: OUTPATIENT SERVICES | Facility: HOSPITAL | Age: 62
LOS: 1 days | Discharge: HOME | End: 2022-10-18

## 2022-10-18 DIAGNOSIS — Z98.890 OTHER SPECIFIED POSTPROCEDURAL STATES: Chronic | ICD-10-CM

## 2022-10-18 DIAGNOSIS — Z00.8 ENCOUNTER FOR OTHER GENERAL EXAMINATION: ICD-10-CM

## 2022-11-07 LAB
A1C WITH ESTIMATED AVERAGE GLUCOSE RESULT: 5.1 % — SIGNIFICANT CHANGE UP (ref 4–5.6)
ALBUMIN SERPL ELPH-MCNC: 5.3 G/DL — HIGH (ref 3.5–5.2)
ALP SERPL-CCNC: 77 U/L — SIGNIFICANT CHANGE UP (ref 30–115)
ALT FLD-CCNC: 14 U/L — SIGNIFICANT CHANGE UP (ref 0–41)
ANION GAP SERPL CALC-SCNC: 18 MMOL/L — HIGH (ref 7–14)
AST SERPL-CCNC: 18 U/L — SIGNIFICANT CHANGE UP (ref 0–41)
BILIRUB SERPL-MCNC: 0.5 MG/DL — SIGNIFICANT CHANGE UP (ref 0.2–1.2)
BUN SERPL-MCNC: 17 MG/DL — SIGNIFICANT CHANGE UP (ref 10–20)
CALCIUM SERPL-MCNC: 9.9 MG/DL — SIGNIFICANT CHANGE UP (ref 8.4–10.5)
CHLORIDE SERPL-SCNC: 99 MMOL/L — SIGNIFICANT CHANGE UP (ref 98–110)
CHOLEST SERPL-MCNC: 215 MG/DL — HIGH
CO2 SERPL-SCNC: 21 MMOL/L — SIGNIFICANT CHANGE UP (ref 17–32)
CREAT SERPL-MCNC: 1 MG/DL — SIGNIFICANT CHANGE UP (ref 0.7–1.5)
EGFR: 85 ML/MIN/1.73M2 — SIGNIFICANT CHANGE UP
ESTIMATED AVERAGE GLUCOSE: 100 MG/DL — SIGNIFICANT CHANGE UP (ref 68–114)
GLUCOSE SERPL-MCNC: 164 MG/DL — HIGH (ref 70–99)
HAV IGM SER-ACNC: SIGNIFICANT CHANGE UP
HBV CORE IGM SER-ACNC: SIGNIFICANT CHANGE UP
HBV SURFACE AG SER-ACNC: SIGNIFICANT CHANGE UP
HCT VFR BLD CALC: 40.7 % — LOW (ref 42–52)
HCV AB S/CO SERPL IA: 0.16 S/CO — SIGNIFICANT CHANGE UP (ref 0–0.99)
HCV AB SERPL-IMP: SIGNIFICANT CHANGE UP
HDLC SERPL-MCNC: 50 MG/DL — SIGNIFICANT CHANGE UP
HGB BLD-MCNC: 14 G/DL — SIGNIFICANT CHANGE UP (ref 14–18)
LIPID PNL WITH DIRECT LDL SERPL: 148 MG/DL — HIGH
MAGNESIUM SERPL-MCNC: 1.7 MG/DL — LOW (ref 1.8–2.4)
MCHC RBC-ENTMCNC: 32.1 PG — HIGH (ref 27–31)
MCHC RBC-ENTMCNC: 34.4 G/DL — SIGNIFICANT CHANGE UP (ref 32–37)
MCV RBC AUTO: 93.3 FL — SIGNIFICANT CHANGE UP (ref 80–94)
NON HDL CHOLESTEROL: 165 MG/DL — HIGH
NRBC # BLD: 0 /100 WBCS — SIGNIFICANT CHANGE UP (ref 0–0)
PLATELET # BLD AUTO: 265 K/UL — SIGNIFICANT CHANGE UP (ref 130–400)
POTASSIUM SERPL-MCNC: 4.4 MMOL/L — SIGNIFICANT CHANGE UP (ref 3.5–5)
POTASSIUM SERPL-SCNC: 4.4 MMOL/L — SIGNIFICANT CHANGE UP (ref 3.5–5)
PROT SERPL-MCNC: 8.1 G/DL — HIGH (ref 6–8)
RBC # BLD: 4.36 M/UL — LOW (ref 4.7–6.1)
RBC # FLD: 12.3 % — SIGNIFICANT CHANGE UP (ref 11.5–14.5)
SODIUM SERPL-SCNC: 138 MMOL/L — SIGNIFICANT CHANGE UP (ref 135–146)
T PALLIDUM AB TITR SER: NEGATIVE — SIGNIFICANT CHANGE UP
TRIGL SERPL-MCNC: 86 MG/DL — SIGNIFICANT CHANGE UP
WBC # BLD: 6.99 K/UL — SIGNIFICANT CHANGE UP (ref 4.8–10.8)
WBC # FLD AUTO: 6.99 K/UL — SIGNIFICANT CHANGE UP (ref 4.8–10.8)

## 2022-11-09 LAB
GAMMA INTERFERON BACKGROUND BLD IA-ACNC: 0.02 IU/ML — SIGNIFICANT CHANGE UP
M TB IFN-G BLD-IMP: NEGATIVE — SIGNIFICANT CHANGE UP
M TB IFN-G CD4+ BCKGRND COR BLD-ACNC: -0.01 IU/ML — SIGNIFICANT CHANGE UP
M TB IFN-G CD4+CD8+ BCKGRND COR BLD-ACNC: -0.01 IU/ML — SIGNIFICANT CHANGE UP
QUANT TB PLUS MITOGEN MINUS NIL: 3.22 IU/ML — SIGNIFICANT CHANGE UP

## 2022-11-15 ENCOUNTER — OUTPATIENT (OUTPATIENT)
Dept: OUTPATIENT SERVICES | Facility: HOSPITAL | Age: 62
LOS: 1 days | Discharge: HOME | End: 2022-11-15

## 2022-11-15 DIAGNOSIS — Z98.890 OTHER SPECIFIED POSTPROCEDURAL STATES: Chronic | ICD-10-CM

## 2022-11-15 DIAGNOSIS — I49.9 CARDIAC ARRHYTHMIA, UNSPECIFIED: ICD-10-CM

## 2022-11-15 PROCEDURE — 93010 ELECTROCARDIOGRAM REPORT: CPT

## 2022-11-29 ENCOUNTER — APPOINTMENT (OUTPATIENT)
Dept: CARDIOLOGY | Facility: CLINIC | Age: 62
End: 2022-11-29

## 2022-12-20 ENCOUNTER — APPOINTMENT (OUTPATIENT)
Dept: CARDIOLOGY | Facility: CLINIC | Age: 62
End: 2022-12-20
Payer: COMMERCIAL

## 2022-12-20 VITALS
SYSTOLIC BLOOD PRESSURE: 120 MMHG | WEIGHT: 156 LBS | HEIGHT: 68 IN | BODY MASS INDEX: 23.64 KG/M2 | HEART RATE: 73 BPM | DIASTOLIC BLOOD PRESSURE: 80 MMHG

## 2022-12-20 DIAGNOSIS — I45.81 LONG QT SYNDROME: ICD-10-CM

## 2022-12-20 DIAGNOSIS — R94.31 ABNORMAL ELECTROCARDIOGRAM [ECG] [EKG]: ICD-10-CM

## 2022-12-20 PROCEDURE — 99214 OFFICE O/P EST MOD 30 MIN: CPT | Mod: 25

## 2022-12-20 PROCEDURE — 93000 ELECTROCARDIOGRAM COMPLETE: CPT

## 2022-12-20 NOTE — PHYSICAL EXAM
[General Appearance - Well Developed] : well developed [Normal Appearance] : normal appearance [Well Groomed] : well groomed [General Appearance - Well Nourished] : well nourished [No Deformities] : no deformities [General Appearance - In No Acute Distress] : no acute distress [Eyelids - No Xanthelasma] : the eyelids demonstrated no xanthelasmas [Normal Oral Mucosa] : normal oral mucosa [No Oral Pallor] : no oral pallor [No Oral Cyanosis] : no oral cyanosis [] : no respiratory distress [Auscultation Breath Sounds / Voice Sounds] : lungs were clear to auscultation bilaterally [Heart Rate And Rhythm] : heart rate and rhythm were normal [Murmurs] : no murmurs present [Arterial Pulses Normal] : the arterial pulses were normal [Edema] : no peripheral edema present [Bowel Sounds] : normal bowel sounds [Abdomen Soft] : soft [Nail Clubbing] : no clubbing of the fingernails [Cyanosis, Localized] : no localized cyanosis [No Xanthoma] : no  xanthoma was observed [Oriented To Time, Place, And Person] : oriented to person, place, and time [Well Developed] : well developed [Well Nourished] : well nourished [No Acute Distress] : no acute distress [Normal Conjunctiva] : normal conjunctiva [Normal Venous Pressure] : normal venous pressure [No Carotid Bruit] : no carotid bruit [Normal S1, S2] : normal S1, S2 [No Murmur] : no murmur [No Rub] : no rub [No Gallop] : no gallop [Clear Lung Fields] : clear lung fields [Good Air Entry] : good air entry [No Respiratory Distress] : no respiratory distress  [Soft] : abdomen soft [Non Tender] : non-tender [No Masses/organomegaly] : no masses/organomegaly [Normal Bowel Sounds] : normal bowel sounds [Normal Gait] : normal gait [No Edema] : no edema [No Cyanosis] : no cyanosis [No Clubbing] : no clubbing [No Varicosities] : no varicosities [No Rash] : no rash [No Skin Lesions] : no skin lesions [Moves all extremities] : moves all extremities [No Focal Deficits] : no focal deficits [Normal Speech] : normal speech [Alert and Oriented] : alert and oriented [Normal memory] : normal memory [FreeTextEntry1] : No JVD

## 2022-12-20 NOTE — REASON FOR VISIT
[Arrhythmia/ECG Abnorrmalities] : arrhythmia/ECG abnormalities [Hyperlipidemia] : hyperlipidemia [FreeTextEntry1] : A former patient of Dr. Corona with a diagnosis of hyperlipidemia, morbid obesity, long QT interval and abnormal EKG

## 2022-12-20 NOTE — HISTORY OF PRESENT ILLNESS
[FreeTextEntry1] : Patient denies cardiac complaints.\par He denies exertional chest pain or shortness of breath..\par There is no evidence of atherosclerotic heart disease.\par His risk factors for ischemic heart disease  mild hypertension, mild hyperlipidemia and a history of obesity\par Denies any history of myocardial infarctions in the past, TIA, CVA, peripheral vascular disease or claudication.\par No history of valvular heart disease or or heart murmur\par Denies presyncope, syncope, arrhythmias\par No history of congestive heart failure, or cardiomyopathic processes.\par \par EKG is normal sinus rhythm, APC

## 2023-01-19 ENCOUNTER — APPOINTMENT (OUTPATIENT)
Dept: CARDIOLOGY | Facility: CLINIC | Age: 63
End: 2023-01-19

## 2023-04-18 ENCOUNTER — APPOINTMENT (OUTPATIENT)
Dept: CARDIOLOGY | Facility: CLINIC | Age: 63
End: 2023-04-18

## 2023-08-30 NOTE — PATIENT PROFILE ADULT - NSPROMUTANXFEARFT_GEN_A_NUR
" progress note      Memo Steven  [id number]  1947     LOS: 1 day     Attending: Guido Etienne MD    Primary Care Provider: Ramin Forman MD      Chief Complaint/Reason for visit:  abd pain    Subjective   Doing fairly well. Feels weak. Had a fall in the bathroom yesterday; he denies injury. Adequate pain control. Has not yet ambulated today. Has not yet voided after wayne removal. Denies f/c/n/v/sob/cp.    Objective     Vital Signs    Visit Vitals  /71 (BP Location: Right arm, Patient Position: Lying)   Pulse 75   Temp 98.3 °F (36.8 °C) (Oral)   Resp 17   Ht 172.7 cm (68\")   Wt 66.7 kg (147 lb)   SpO2 94%   BMI 22.35 kg/m²     Temp (24hrs), Av.1 °F (36.7 °C), Min:97 °F (36.1 °C), Max:99.4 °F (37.4 °C)      Nutrition: Clear diet    Respiratory: RA    Physical Exam:     General Appearance:    Alert, cooperative, in no acute distress   Head:    Normocephalic, without obvious abnormality, atraumatic    Lungs:     Normal effort, symmetric chest rise, no crepitus, clear to      auscultation bilaterally             Heart:    Regular rhythm and normal rate, normal S1 and S2   Abdomen:     Soft, expected tenderness. Clean incision   Extremities:   No clubbing, cyanosis or edema.  No deformities.    Pulses:   Pulses palpable and equal bilaterally   Skin:   No bleeding, bruising or rash   Neurologic:   Moves all extremities with no obvious focal motor deficit.  Cranial nerves 2 - 12 grossly intact     Results Review:     I reviewed the patient's new clinical results.   Results from last 7 days   Lab Units 23  0348   WBC 10*3/mm3 7.40   HEMOGLOBIN g/dL 10.8*   HEMATOCRIT % 32.6*   PLATELETS 10*3/mm3 143     Results from last 7 days   Lab Units 23  0348   SODIUM mmol/L 139   POTASSIUM mmol/L 4.3   CHLORIDE mmol/L 107   CO2 mmol/L 27.0   BUN mg/dL 15   CREATININE mg/dL 0.71*   CALCIUM mg/dL 7.6*   GLUCOSE mg/dL 144*     I reviewed the patient's new imaging including images and reports.    All " medications reviewed.   acetaminophen, 650 mg, Oral, Q8H  alvimopan, 12 mg, Oral, BID  amLODIPine, 2.5 mg, Oral, Daily  cycloSPORINE, 1 drop, Both Eyes, BID  escitalopram, 40 mg, Oral, QAM  heparin (porcine), 5,000 Units, Subcutaneous, Q8H  levothyroxine, 50 mcg, Oral, Q AM  OLANZapine zydis, 20 mg, Oral, Nightly  pravastatin, 20 mg, Oral, Daily  pregabalin, 100 mg, Oral, BID  tamsulosin, 0.4 mg, Oral, Daily        Assessment & Plan     S/P laparoscopy, ileocolic resection    Colon neoplasm    HTN (hypertension)    Anxiety and depression    Hypothyroidism (acquired)    Acute postoperative pain      Plan  1. Ambulation  2. Pain control-prns   3. IS-encouraged  4. DVT proph- mechs/heparin   5. Bowel regimen  6. Clear liquid diet. Continue IVF   7. Monitor post-op labs  8. DC planning for home     - Hypertension:  Resume home medications as appropriate, formulary substitution when indicated.  Holding parameters.  Prn medications for elevated blood pressure.     -Anxiety and depression: Maintained on home regimen.     -Hypothyroidism: Resume replacement.      Macarena Richardson, MAUDE  08/30/23  13:04 EDT   N/A

## 2023-11-06 ENCOUNTER — OUTPATIENT (OUTPATIENT)
Dept: OUTPATIENT SERVICES | Facility: HOSPITAL | Age: 63
LOS: 1 days | End: 2023-11-06
Payer: COMMERCIAL

## 2023-11-06 DIAGNOSIS — Z98.890 OTHER SPECIFIED POSTPROCEDURAL STATES: Chronic | ICD-10-CM

## 2023-11-06 DIAGNOSIS — Z00.8 ENCOUNTER FOR OTHER GENERAL EXAMINATION: ICD-10-CM

## 2023-11-06 LAB
A1C WITH ESTIMATED AVERAGE GLUCOSE RESULT: 5.2 % — SIGNIFICANT CHANGE UP (ref 4–5.6)
A1C WITH ESTIMATED AVERAGE GLUCOSE RESULT: 5.2 % — SIGNIFICANT CHANGE UP (ref 4–5.6)
ALBUMIN SERPL ELPH-MCNC: 4.9 G/DL — SIGNIFICANT CHANGE UP (ref 3.5–5.2)
ALBUMIN SERPL ELPH-MCNC: 4.9 G/DL — SIGNIFICANT CHANGE UP (ref 3.5–5.2)
ALP SERPL-CCNC: 55 U/L — SIGNIFICANT CHANGE UP (ref 30–115)
ALP SERPL-CCNC: 55 U/L — SIGNIFICANT CHANGE UP (ref 30–115)
ALT FLD-CCNC: 12 U/L — SIGNIFICANT CHANGE UP (ref 0–41)
ALT FLD-CCNC: 12 U/L — SIGNIFICANT CHANGE UP (ref 0–41)
ANION GAP SERPL CALC-SCNC: 12 MMOL/L — SIGNIFICANT CHANGE UP (ref 7–14)
ANION GAP SERPL CALC-SCNC: 12 MMOL/L — SIGNIFICANT CHANGE UP (ref 7–14)
AST SERPL-CCNC: 18 U/L — SIGNIFICANT CHANGE UP (ref 0–41)
AST SERPL-CCNC: 18 U/L — SIGNIFICANT CHANGE UP (ref 0–41)
BILIRUB SERPL-MCNC: 0.3 MG/DL — SIGNIFICANT CHANGE UP (ref 0.2–1.2)
BILIRUB SERPL-MCNC: 0.3 MG/DL — SIGNIFICANT CHANGE UP (ref 0.2–1.2)
BUN SERPL-MCNC: 19 MG/DL — SIGNIFICANT CHANGE UP (ref 10–20)
BUN SERPL-MCNC: 19 MG/DL — SIGNIFICANT CHANGE UP (ref 10–20)
CALCIUM SERPL-MCNC: 9.9 MG/DL — SIGNIFICANT CHANGE UP (ref 8.4–10.5)
CALCIUM SERPL-MCNC: 9.9 MG/DL — SIGNIFICANT CHANGE UP (ref 8.4–10.5)
CHLORIDE SERPL-SCNC: 102 MMOL/L — SIGNIFICANT CHANGE UP (ref 98–110)
CHLORIDE SERPL-SCNC: 102 MMOL/L — SIGNIFICANT CHANGE UP (ref 98–110)
CHOLEST SERPL-MCNC: 148 MG/DL — SIGNIFICANT CHANGE UP
CHOLEST SERPL-MCNC: 148 MG/DL — SIGNIFICANT CHANGE UP
CO2 SERPL-SCNC: 25 MMOL/L — SIGNIFICANT CHANGE UP (ref 17–32)
CO2 SERPL-SCNC: 25 MMOL/L — SIGNIFICANT CHANGE UP (ref 17–32)
CREAT SERPL-MCNC: 1 MG/DL — SIGNIFICANT CHANGE UP (ref 0.7–1.5)
CREAT SERPL-MCNC: 1 MG/DL — SIGNIFICANT CHANGE UP (ref 0.7–1.5)
EGFR: 84 ML/MIN/1.73M2 — SIGNIFICANT CHANGE UP
EGFR: 84 ML/MIN/1.73M2 — SIGNIFICANT CHANGE UP
ESTIMATED AVERAGE GLUCOSE: 103 MG/DL — SIGNIFICANT CHANGE UP (ref 68–114)
ESTIMATED AVERAGE GLUCOSE: 103 MG/DL — SIGNIFICANT CHANGE UP (ref 68–114)
GLUCOSE SERPL-MCNC: 98 MG/DL — SIGNIFICANT CHANGE UP (ref 70–99)
GLUCOSE SERPL-MCNC: 98 MG/DL — SIGNIFICANT CHANGE UP (ref 70–99)
HCT VFR BLD CALC: 37.1 % — LOW (ref 42–52)
HCT VFR BLD CALC: 37.1 % — LOW (ref 42–52)
HDLC SERPL-MCNC: 61 MG/DL — SIGNIFICANT CHANGE UP
HDLC SERPL-MCNC: 61 MG/DL — SIGNIFICANT CHANGE UP
HGB BLD-MCNC: 12.1 G/DL — LOW (ref 14–18)
HGB BLD-MCNC: 12.1 G/DL — LOW (ref 14–18)
LIPID PNL WITH DIRECT LDL SERPL: 80 MG/DL — SIGNIFICANT CHANGE UP
LIPID PNL WITH DIRECT LDL SERPL: 80 MG/DL — SIGNIFICANT CHANGE UP
MAGNESIUM SERPL-MCNC: 2 MG/DL — SIGNIFICANT CHANGE UP (ref 1.8–2.4)
MAGNESIUM SERPL-MCNC: 2 MG/DL — SIGNIFICANT CHANGE UP (ref 1.8–2.4)
MCHC RBC-ENTMCNC: 31.8 PG — HIGH (ref 27–31)
MCHC RBC-ENTMCNC: 31.8 PG — HIGH (ref 27–31)
MCHC RBC-ENTMCNC: 32.6 G/DL — SIGNIFICANT CHANGE UP (ref 32–37)
MCHC RBC-ENTMCNC: 32.6 G/DL — SIGNIFICANT CHANGE UP (ref 32–37)
MCV RBC AUTO: 97.6 FL — HIGH (ref 80–94)
MCV RBC AUTO: 97.6 FL — HIGH (ref 80–94)
NON HDL CHOLESTEROL: 87 MG/DL — SIGNIFICANT CHANGE UP
NON HDL CHOLESTEROL: 87 MG/DL — SIGNIFICANT CHANGE UP
NRBC # BLD: 0 /100 WBCS — SIGNIFICANT CHANGE UP (ref 0–0)
NRBC # BLD: 0 /100 WBCS — SIGNIFICANT CHANGE UP (ref 0–0)
PLATELET # BLD AUTO: 231 K/UL — SIGNIFICANT CHANGE UP (ref 130–400)
PLATELET # BLD AUTO: 231 K/UL — SIGNIFICANT CHANGE UP (ref 130–400)
PMV BLD: 9.5 FL — SIGNIFICANT CHANGE UP (ref 7.4–10.4)
PMV BLD: 9.5 FL — SIGNIFICANT CHANGE UP (ref 7.4–10.4)
POTASSIUM SERPL-MCNC: 4.5 MMOL/L — SIGNIFICANT CHANGE UP (ref 3.5–5)
POTASSIUM SERPL-MCNC: 4.5 MMOL/L — SIGNIFICANT CHANGE UP (ref 3.5–5)
POTASSIUM SERPL-SCNC: 4.5 MMOL/L — SIGNIFICANT CHANGE UP (ref 3.5–5)
POTASSIUM SERPL-SCNC: 4.5 MMOL/L — SIGNIFICANT CHANGE UP (ref 3.5–5)
PROT SERPL-MCNC: 7.4 G/DL — SIGNIFICANT CHANGE UP (ref 6–8)
PROT SERPL-MCNC: 7.4 G/DL — SIGNIFICANT CHANGE UP (ref 6–8)
RBC # BLD: 3.8 M/UL — LOW (ref 4.7–6.1)
RBC # BLD: 3.8 M/UL — LOW (ref 4.7–6.1)
RBC # FLD: 12.2 % — SIGNIFICANT CHANGE UP (ref 11.5–14.5)
RBC # FLD: 12.2 % — SIGNIFICANT CHANGE UP (ref 11.5–14.5)
SODIUM SERPL-SCNC: 139 MMOL/L — SIGNIFICANT CHANGE UP (ref 135–146)
SODIUM SERPL-SCNC: 139 MMOL/L — SIGNIFICANT CHANGE UP (ref 135–146)
TRIGL SERPL-MCNC: 37 MG/DL — SIGNIFICANT CHANGE UP
TRIGL SERPL-MCNC: 37 MG/DL — SIGNIFICANT CHANGE UP
WBC # BLD: 4.89 K/UL — SIGNIFICANT CHANGE UP (ref 4.8–10.8)
WBC # BLD: 4.89 K/UL — SIGNIFICANT CHANGE UP (ref 4.8–10.8)
WBC # FLD AUTO: 4.89 K/UL — SIGNIFICANT CHANGE UP (ref 4.8–10.8)
WBC # FLD AUTO: 4.89 K/UL — SIGNIFICANT CHANGE UP (ref 4.8–10.8)

## 2023-11-06 PROCEDURE — 86480 TB TEST CELL IMMUN MEASURE: CPT

## 2023-11-06 PROCEDURE — 80074 ACUTE HEPATITIS PANEL: CPT

## 2023-11-06 PROCEDURE — 86780 TREPONEMA PALLIDUM: CPT

## 2023-11-06 PROCEDURE — 36415 COLL VENOUS BLD VENIPUNCTURE: CPT

## 2023-11-06 PROCEDURE — 80053 COMPREHEN METABOLIC PANEL: CPT

## 2023-11-06 PROCEDURE — 83735 ASSAY OF MAGNESIUM: CPT

## 2023-11-06 PROCEDURE — 80061 LIPID PANEL: CPT

## 2023-11-06 PROCEDURE — 85027 COMPLETE CBC AUTOMATED: CPT

## 2023-11-06 PROCEDURE — 81003 URINALYSIS AUTO W/O SCOPE: CPT

## 2023-11-06 PROCEDURE — 83036 HEMOGLOBIN GLYCOSYLATED A1C: CPT

## 2023-11-07 DIAGNOSIS — Z00.8 ENCOUNTER FOR OTHER GENERAL EXAMINATION: ICD-10-CM

## 2023-11-07 LAB
APPEARANCE UR: CLEAR — SIGNIFICANT CHANGE UP
APPEARANCE UR: CLEAR — SIGNIFICANT CHANGE UP
BILIRUB UR-MCNC: NEGATIVE — SIGNIFICANT CHANGE UP
BILIRUB UR-MCNC: NEGATIVE — SIGNIFICANT CHANGE UP
COLOR SPEC: YELLOW — SIGNIFICANT CHANGE UP
COLOR SPEC: YELLOW — SIGNIFICANT CHANGE UP
DIFF PNL FLD: NEGATIVE — SIGNIFICANT CHANGE UP
DIFF PNL FLD: NEGATIVE — SIGNIFICANT CHANGE UP
GLUCOSE UR QL: NEGATIVE MG/DL — SIGNIFICANT CHANGE UP
GLUCOSE UR QL: NEGATIVE MG/DL — SIGNIFICANT CHANGE UP
HAV IGM SER-ACNC: SIGNIFICANT CHANGE UP
HAV IGM SER-ACNC: SIGNIFICANT CHANGE UP
HBV CORE IGM SER-ACNC: SIGNIFICANT CHANGE UP
HBV CORE IGM SER-ACNC: SIGNIFICANT CHANGE UP
HBV SURFACE AG SER-ACNC: SIGNIFICANT CHANGE UP
HBV SURFACE AG SER-ACNC: SIGNIFICANT CHANGE UP
HCV AB S/CO SERPL IA: 0.09 S/CO — SIGNIFICANT CHANGE UP (ref 0–0.99)
HCV AB S/CO SERPL IA: 0.09 S/CO — SIGNIFICANT CHANGE UP (ref 0–0.99)
HCV AB SERPL-IMP: SIGNIFICANT CHANGE UP
HCV AB SERPL-IMP: SIGNIFICANT CHANGE UP
KETONES UR-MCNC: NEGATIVE MG/DL — SIGNIFICANT CHANGE UP
KETONES UR-MCNC: NEGATIVE MG/DL — SIGNIFICANT CHANGE UP
LEUKOCYTE ESTERASE UR-ACNC: NEGATIVE — SIGNIFICANT CHANGE UP
LEUKOCYTE ESTERASE UR-ACNC: NEGATIVE — SIGNIFICANT CHANGE UP
NITRITE UR-MCNC: NEGATIVE — SIGNIFICANT CHANGE UP
NITRITE UR-MCNC: NEGATIVE — SIGNIFICANT CHANGE UP
PH UR: 7 — SIGNIFICANT CHANGE UP (ref 5–8)
PH UR: 7 — SIGNIFICANT CHANGE UP (ref 5–8)
PROT UR-MCNC: NEGATIVE MG/DL — SIGNIFICANT CHANGE UP
PROT UR-MCNC: NEGATIVE MG/DL — SIGNIFICANT CHANGE UP
SP GR SPEC: 1.01 — SIGNIFICANT CHANGE UP (ref 1–1.03)
SP GR SPEC: 1.01 — SIGNIFICANT CHANGE UP (ref 1–1.03)
T PALLIDUM AB TITR SER: NEGATIVE — SIGNIFICANT CHANGE UP
T PALLIDUM AB TITR SER: NEGATIVE — SIGNIFICANT CHANGE UP
UROBILINOGEN FLD QL: 0.2 MG/DL — SIGNIFICANT CHANGE UP (ref 0.2–1)
UROBILINOGEN FLD QL: 0.2 MG/DL — SIGNIFICANT CHANGE UP (ref 0.2–1)

## 2023-11-09 LAB
GAMMA INTERFERON BACKGROUND BLD IA-ACNC: 0.02 IU/ML — SIGNIFICANT CHANGE UP
GAMMA INTERFERON BACKGROUND BLD IA-ACNC: 0.02 IU/ML — SIGNIFICANT CHANGE UP
M TB IFN-G BLD-IMP: NEGATIVE — SIGNIFICANT CHANGE UP
M TB IFN-G BLD-IMP: NEGATIVE — SIGNIFICANT CHANGE UP
M TB IFN-G CD4+ BCKGRND COR BLD-ACNC: 0 IU/ML — SIGNIFICANT CHANGE UP
M TB IFN-G CD4+ BCKGRND COR BLD-ACNC: 0 IU/ML — SIGNIFICANT CHANGE UP
M TB IFN-G CD4+CD8+ BCKGRND COR BLD-ACNC: 0 IU/ML — SIGNIFICANT CHANGE UP
M TB IFN-G CD4+CD8+ BCKGRND COR BLD-ACNC: 0 IU/ML — SIGNIFICANT CHANGE UP
QUANT TB PLUS MITOGEN MINUS NIL: 2.81 IU/ML — SIGNIFICANT CHANGE UP
QUANT TB PLUS MITOGEN MINUS NIL: 2.81 IU/ML — SIGNIFICANT CHANGE UP

## 2023-11-14 ENCOUNTER — OUTPATIENT (OUTPATIENT)
Dept: OUTPATIENT SERVICES | Facility: HOSPITAL | Age: 63
LOS: 1 days | End: 2023-11-14
Payer: COMMERCIAL

## 2023-11-14 DIAGNOSIS — Z98.890 OTHER SPECIFIED POSTPROCEDURAL STATES: Chronic | ICD-10-CM

## 2023-11-14 DIAGNOSIS — I49.9 CARDIAC ARRHYTHMIA, UNSPECIFIED: ICD-10-CM

## 2023-11-14 PROCEDURE — 93005 ELECTROCARDIOGRAM TRACING: CPT

## 2023-11-14 PROCEDURE — 93010 ELECTROCARDIOGRAM REPORT: CPT

## 2023-11-15 DIAGNOSIS — I49.9 CARDIAC ARRHYTHMIA, UNSPECIFIED: ICD-10-CM

## 2024-01-23 ENCOUNTER — APPOINTMENT (OUTPATIENT)
Dept: CARDIOLOGY | Facility: CLINIC | Age: 64
End: 2024-01-23
Payer: COMMERCIAL

## 2024-01-23 VITALS
BODY MASS INDEX: 25.01 KG/M2 | DIASTOLIC BLOOD PRESSURE: 84 MMHG | HEART RATE: 78 BPM | HEIGHT: 68 IN | SYSTOLIC BLOOD PRESSURE: 132 MMHG | WEIGHT: 165 LBS

## 2024-01-23 DIAGNOSIS — E78.2 MIXED HYPERLIPIDEMIA: ICD-10-CM

## 2024-01-23 DIAGNOSIS — E66.01 MORBID (SEVERE) OBESITY DUE TO EXCESS CALORIES: ICD-10-CM

## 2024-01-23 DIAGNOSIS — I10 ESSENTIAL (PRIMARY) HYPERTENSION: ICD-10-CM

## 2024-01-23 PROCEDURE — 99214 OFFICE O/P EST MOD 30 MIN: CPT | Mod: 25

## 2024-01-23 PROCEDURE — 99204 OFFICE O/P NEW MOD 45 MIN: CPT | Mod: 25

## 2024-01-23 PROCEDURE — 93000 ELECTROCARDIOGRAM COMPLETE: CPT

## 2024-01-23 RX ORDER — METHADONE HYDROCHLORIDE 10 MG/ML
10 CONCENTRATE ORAL
Refills: 0 | Status: ACTIVE | COMMUNITY

## 2024-01-23 RX ORDER — METHADONE HYDROCHLORIDE 10 MG/ML
10 INJECTION, SOLUTION INTRAMUSCULAR; INTRAVENOUS; SUBCUTANEOUS
Refills: 0 | Status: DISCONTINUED | COMMUNITY
End: 2024-01-23

## 2024-01-23 NOTE — ASSESSMENT
[FreeTextEntry1] : LVH on echo HTN NIDDM (-) thall long QT on methadone no ventricular arrtyhmia except ioslated PVC on holter avoid meds and SADS site given to him

## 2024-01-23 NOTE — PHYSICAL EXAM
[Well Developed] : well developed [Well Nourished] : well nourished [No Acute Distress] : no acute distress [Normal Venous Pressure] : normal venous pressure [No Carotid Bruit] : no carotid bruit [Normal S1, S2] : normal S1, S2 [No Murmur] : no murmur [No Rub] : no rub [No Gallop] : no gallop [Clear Lung Fields] : clear lung fields [Good Air Entry] : good air entry [No Respiratory Distress] : no respiratory distress  [Soft] : abdomen soft [Non Tender] : non-tender [No Masses/organomegaly] : no masses/organomegaly [Normal Bowel Sounds] : normal bowel sounds [Normal Gait] : normal gait [No Edema] : no edema [No Cyanosis] : no cyanosis [No Clubbing] : no clubbing [No Rash] : no rash [No Varicosities] : no varicosities [No Skin Lesions] : no skin lesions [Moves all extremities] : moves all extremities [No Focal Deficits] : no focal deficits [Normal Speech] : normal speech [Alert and Oriented] : alert and oriented [Normal memory] : normal memory [General Appearance - Well Developed] : well developed [Normal Appearance] : normal appearance [Well Groomed] : well groomed [General Appearance - Well Nourished] : well nourished [No Deformities] : no deformities [General Appearance - In No Acute Distress] : no acute distress [Normal Conjunctiva] : the conjunctiva exhibited no abnormalities [Eyelids - No Xanthelasma] : the eyelids demonstrated no xanthelasmas [Normal Oral Mucosa] : normal oral mucosa [No Oral Pallor] : no oral pallor [No Oral Cyanosis] : no oral cyanosis [] : no respiratory distress [Auscultation Breath Sounds / Voice Sounds] : lungs were clear to auscultation bilaterally [Heart Rate And Rhythm] : heart rate and rhythm were normal [Murmurs] : no murmurs present [Arterial Pulses Normal] : the arterial pulses were normal [Edema] : no peripheral edema present [Bowel Sounds] : normal bowel sounds [Abdomen Soft] : soft [Nail Clubbing] : no clubbing of the fingernails [Cyanosis, Localized] : no localized cyanosis [No Xanthoma] : no  xanthoma was observed [Oriented To Time, Place, And Person] : oriented to person, place, and time [FreeTextEntry1] : No JVD

## 2024-01-23 NOTE — DISCUSSION/SUMMARY
[FreeTextEntry1] : Plan QTC stable at 461 today 428 qtc normal today qt400 in limb leads longer in lateral leads no palp aymptomatoc lost good deal of weight will f/u ekg since dispersion if still consider holter or lowering dose continue dose of methadone needs lipid profile.

## 2024-03-20 ENCOUNTER — EMERGENCY (EMERGENCY)
Facility: HOSPITAL | Age: 64
LOS: 0 days | Discharge: HOME | End: 2024-03-20
Attending: EMERGENCY MEDICINE
Payer: COMMERCIAL

## 2024-03-20 VITALS
DIASTOLIC BLOOD PRESSURE: 82 MMHG | TEMPERATURE: 98 F | HEART RATE: 65 BPM | HEIGHT: 68 IN | SYSTOLIC BLOOD PRESSURE: 161 MMHG | OXYGEN SATURATION: 100 % | RESPIRATION RATE: 18 BRPM | WEIGHT: 169.98 LBS

## 2024-03-20 VITALS — HEART RATE: 60 BPM | TEMPERATURE: 98 F | DIASTOLIC BLOOD PRESSURE: 76 MMHG | SYSTOLIC BLOOD PRESSURE: 154 MMHG

## 2024-03-20 DIAGNOSIS — K29.80 DUODENITIS WITHOUT BLEEDING: ICD-10-CM

## 2024-03-20 DIAGNOSIS — I10 ESSENTIAL (PRIMARY) HYPERTENSION: ICD-10-CM

## 2024-03-20 DIAGNOSIS — R11.2 NAUSEA WITH VOMITING, UNSPECIFIED: ICD-10-CM

## 2024-03-20 DIAGNOSIS — K29.70 GASTRITIS, UNSPECIFIED, WITHOUT BLEEDING: ICD-10-CM

## 2024-03-20 DIAGNOSIS — Z98.890 OTHER SPECIFIED POSTPROCEDURAL STATES: Chronic | ICD-10-CM

## 2024-03-20 LAB
ALBUMIN SERPL ELPH-MCNC: 4.3 G/DL — SIGNIFICANT CHANGE UP (ref 3.5–5.2)
ALP SERPL-CCNC: 56 U/L — SIGNIFICANT CHANGE UP (ref 30–115)
ALT FLD-CCNC: 13 U/L — SIGNIFICANT CHANGE UP (ref 0–41)
ANION GAP SERPL CALC-SCNC: 12 MMOL/L — SIGNIFICANT CHANGE UP (ref 7–14)
AST SERPL-CCNC: 38 U/L — SIGNIFICANT CHANGE UP (ref 0–41)
BASOPHILS # BLD AUTO: 0.01 K/UL — SIGNIFICANT CHANGE UP (ref 0–0.2)
BASOPHILS NFR BLD AUTO: 0.1 % — SIGNIFICANT CHANGE UP (ref 0–1)
BILIRUB DIRECT SERPL-MCNC: <0.2 MG/DL — SIGNIFICANT CHANGE UP (ref 0–0.3)
BILIRUB INDIRECT FLD-MCNC: >0.2 MG/DL — SIGNIFICANT CHANGE UP (ref 0.2–1.2)
BILIRUB SERPL-MCNC: 0.4 MG/DL — SIGNIFICANT CHANGE UP (ref 0.2–1.2)
BUN SERPL-MCNC: 23 MG/DL — HIGH (ref 10–20)
CALCIUM SERPL-MCNC: 9.1 MG/DL — SIGNIFICANT CHANGE UP (ref 8.4–10.5)
CHLORIDE SERPL-SCNC: 99 MMOL/L — SIGNIFICANT CHANGE UP (ref 98–110)
CO2 SERPL-SCNC: 25 MMOL/L — SIGNIFICANT CHANGE UP (ref 17–32)
CREAT SERPL-MCNC: 0.9 MG/DL — SIGNIFICANT CHANGE UP (ref 0.7–1.5)
EGFR: 95 ML/MIN/1.73M2 — SIGNIFICANT CHANGE UP
EOSINOPHIL # BLD AUTO: 0.02 K/UL — SIGNIFICANT CHANGE UP (ref 0–0.7)
EOSINOPHIL NFR BLD AUTO: 0.3 % — SIGNIFICANT CHANGE UP (ref 0–8)
GLUCOSE SERPL-MCNC: 128 MG/DL — HIGH (ref 70–99)
HCT VFR BLD CALC: 37.4 % — LOW (ref 42–52)
HGB BLD-MCNC: 13.6 G/DL — LOW (ref 14–18)
IMM GRANULOCYTES NFR BLD AUTO: 0.6 % — HIGH (ref 0.1–0.3)
LACTATE SERPL-SCNC: 1.2 MMOL/L — SIGNIFICANT CHANGE UP (ref 0.7–2)
LIDOCAIN IGE QN: 16 U/L — SIGNIFICANT CHANGE UP (ref 7–60)
LYMPHOCYTES # BLD AUTO: 1.16 K/UL — LOW (ref 1.2–3.4)
LYMPHOCYTES # BLD AUTO: 16.1 % — LOW (ref 20.5–51.1)
MCHC RBC-ENTMCNC: 32.5 PG — HIGH (ref 27–31)
MCHC RBC-ENTMCNC: 36.4 G/DL — SIGNIFICANT CHANGE UP (ref 32–37)
MCV RBC AUTO: 89.5 FL — SIGNIFICANT CHANGE UP (ref 80–94)
MONOCYTES # BLD AUTO: 0.56 K/UL — SIGNIFICANT CHANGE UP (ref 0.1–0.6)
MONOCYTES NFR BLD AUTO: 7.8 % — SIGNIFICANT CHANGE UP (ref 1.7–9.3)
NEUTROPHILS # BLD AUTO: 5.42 K/UL — SIGNIFICANT CHANGE UP (ref 1.4–6.5)
NEUTROPHILS NFR BLD AUTO: 75.1 % — SIGNIFICANT CHANGE UP (ref 42.2–75.2)
NRBC # BLD: 0 /100 WBCS — SIGNIFICANT CHANGE UP (ref 0–0)
PLATELET # BLD AUTO: 208 K/UL — SIGNIFICANT CHANGE UP (ref 130–400)
PMV BLD: 8.9 FL — SIGNIFICANT CHANGE UP (ref 7.4–10.4)
POTASSIUM SERPL-MCNC: 5.4 MMOL/L — HIGH (ref 3.5–5)
POTASSIUM SERPL-SCNC: 5.4 MMOL/L — HIGH (ref 3.5–5)
PROT SERPL-MCNC: 7.4 G/DL — SIGNIFICANT CHANGE UP (ref 6–8)
RBC # BLD: 4.18 M/UL — LOW (ref 4.7–6.1)
RBC # FLD: 11.6 % — SIGNIFICANT CHANGE UP (ref 11.5–14.5)
SODIUM SERPL-SCNC: 136 MMOL/L — SIGNIFICANT CHANGE UP (ref 135–146)
WBC # BLD: 7.21 K/UL — SIGNIFICANT CHANGE UP (ref 4.8–10.8)
WBC # FLD AUTO: 7.21 K/UL — SIGNIFICANT CHANGE UP (ref 4.8–10.8)

## 2024-03-20 PROCEDURE — 83690 ASSAY OF LIPASE: CPT

## 2024-03-20 PROCEDURE — 96374 THER/PROPH/DIAG INJ IV PUSH: CPT | Mod: XU

## 2024-03-20 PROCEDURE — 74177 CT ABD & PELVIS W/CONTRAST: CPT | Mod: MC

## 2024-03-20 PROCEDURE — 80048 BASIC METABOLIC PNL TOTAL CA: CPT

## 2024-03-20 PROCEDURE — 80076 HEPATIC FUNCTION PANEL: CPT

## 2024-03-20 PROCEDURE — 74177 CT ABD & PELVIS W/CONTRAST: CPT | Mod: 26,MC

## 2024-03-20 PROCEDURE — 93005 ELECTROCARDIOGRAM TRACING: CPT

## 2024-03-20 PROCEDURE — 83605 ASSAY OF LACTIC ACID: CPT

## 2024-03-20 PROCEDURE — 85025 COMPLETE CBC W/AUTO DIFF WBC: CPT

## 2024-03-20 PROCEDURE — 93010 ELECTROCARDIOGRAM REPORT: CPT

## 2024-03-20 PROCEDURE — 99285 EMERGENCY DEPT VISIT HI MDM: CPT | Mod: 25

## 2024-03-20 PROCEDURE — 99285 EMERGENCY DEPT VISIT HI MDM: CPT

## 2024-03-20 PROCEDURE — 36415 COLL VENOUS BLD VENIPUNCTURE: CPT

## 2024-03-20 RX ORDER — FAMOTIDINE 10 MG/ML
1 INJECTION INTRAVENOUS
Qty: 28 | Refills: 0
Start: 2024-03-20 | End: 2024-04-02

## 2024-03-20 RX ORDER — ONDANSETRON 8 MG/1
1 TABLET, FILM COATED ORAL
Qty: 21 | Refills: 0
Start: 2024-03-20 | End: 2024-03-26

## 2024-03-20 RX ORDER — ONDANSETRON 8 MG/1
4 TABLET, FILM COATED ORAL ONCE
Refills: 0 | Status: COMPLETED | OUTPATIENT
Start: 2024-03-20 | End: 2024-03-20

## 2024-03-20 RX ORDER — FAMOTIDINE 10 MG/ML
20 INJECTION INTRAVENOUS ONCE
Refills: 0 | Status: DISCONTINUED | OUTPATIENT
Start: 2024-03-20 | End: 2024-03-20

## 2024-03-20 RX ORDER — SODIUM CHLORIDE 9 MG/ML
2000 INJECTION INTRAMUSCULAR; INTRAVENOUS; SUBCUTANEOUS ONCE
Refills: 0 | Status: COMPLETED | OUTPATIENT
Start: 2024-03-20 | End: 2024-03-20

## 2024-03-20 RX ADMIN — ONDANSETRON 4 MILLIGRAM(S): 8 TABLET, FILM COATED ORAL at 08:22

## 2024-03-20 RX ADMIN — SODIUM CHLORIDE 2000 MILLILITER(S): 9 INJECTION INTRAMUSCULAR; INTRAVENOUS; SUBCUTANEOUS at 08:22

## 2024-03-20 NOTE — ED PROVIDER NOTE - CLINICAL SUMMARY MEDICAL DECISION MAKING FREE TEXT BOX
patient presents for evaluation of vomiting however no abdominal pain provided IV fluids patient.  This is followed by CT CT demonstrates evidence of duodenitis patient was made aware of findings on CT advised to follow-up with GI next 24 to 48 hours or return to the emergency department for further concerns patient is aware

## 2024-03-20 NOTE — ED PROVIDER NOTE - OBJECTIVE STATEMENT
Patient is a 63-year-old male history of opioid abuse on methadone, hypertension here for evaluation of nausea vomiting for 3 days.  Patient denies fever, chills, chest pain, shortness of breath.

## 2024-03-20 NOTE — ED PROVIDER NOTE - ATTENDING APP SHARED VISIT CONTRIBUTION OF CARE
I have personally performed a history and physical exam on this patient and personally directed the management of the patient. Patient is a 63-year-old male presents for evaluation of nausea vomiting and for the past several days nonbilious nonbloody averaging between 5 and 8 episodes per day denies any fevers chills chest pain shortness of breath diarrhea dysuria hesitancy or frequency    Physical exam patient is normocephalic atraumatic pupils equal round react chest clear to auscultation bilaterally abdomen soft nontender nondistended bowel sounds positive no guarding or extremities full range of motion no focal deficits    Assessment plan patient presents for evaluation of vomiting however no abdominal pain provided IV fluids patient.  This is followed by CT CT demonstrates evidence of duodenitis patient was made aware of findings on CT advised to follow-up with GI next 24 to 48 hours or return to the emergency department for further concerns patient is aware

## 2024-03-20 NOTE — ED PROVIDER NOTE - PROGRESS NOTE DETAILS
Patient made aware of findings and will follow-up with GI as an outpatient.  Discharge patient with Zofran and Pepcid.

## 2024-03-20 NOTE — ED PROVIDER NOTE - NSPTACCESSSVCSAPPTDETAILS_ED_ALL_ED_FT
Called patient and after patient identifiers verified, patient given lab results per [facility]. Patient verbalized understanding. gastritis and duodenitis found on ct, n/v x 3 days

## 2024-03-20 NOTE — ED PROVIDER NOTE - PATIENT PORTAL LINK FT
You can access the FollowMyHealth Patient Portal offered by [facility] by registering at the following website: [email or web address]. By joining [facility]’s FollowMyHealth portal, you will also be able to view your health information using other applications (apps) compatible with our system.

## 2024-03-20 NOTE — ED PROVIDER NOTE - NSFOLLOWUPINSTRUCTIONS_ED_ALL_ED_FT
Our Emergency Department Referral Coordinators will be reaching out to you in the next 24-48 hours from 9:00am to 5:00pm with a follow up appointment. Please expect a phone call from the hospital in that time frame. If you do not receive a call or if you have any questions or concerns, you can reach them at   [phone]

## 2024-05-21 RX ORDER — AMLODIPINE BESYLATE 2.5 MG/1
2.5 TABLET ORAL DAILY
Qty: 90 | Refills: 0 | Status: ACTIVE | COMMUNITY
Start: 1900-01-01 | End: 1900-01-01

## 2024-05-21 RX ORDER — AMLODIPINE BESYLATE 5 MG/1
5 TABLET ORAL DAILY
Refills: 0 | Status: DISCONTINUED | COMMUNITY
End: 2024-05-21

## 2024-05-21 RX ORDER — VALSARTAN 160 MG/1
160 TABLET, COATED ORAL DAILY
Qty: 90 | Refills: 0 | Status: ACTIVE | COMMUNITY
Start: 1900-01-01 | End: 1900-01-01

## 2024-05-29 ENCOUNTER — NON-APPOINTMENT (OUTPATIENT)
Age: 64
End: 2024-05-29

## 2024-10-24 ENCOUNTER — OUTPATIENT (OUTPATIENT)
Dept: OUTPATIENT SERVICES | Facility: HOSPITAL | Age: 64
LOS: 1 days | End: 2024-10-24
Payer: COMMERCIAL

## 2024-10-24 DIAGNOSIS — Z00.8 ENCOUNTER FOR OTHER GENERAL EXAMINATION: ICD-10-CM

## 2024-10-24 DIAGNOSIS — Z98.890 OTHER SPECIFIED POSTPROCEDURAL STATES: Chronic | ICD-10-CM

## 2024-10-24 LAB
A1C WITH ESTIMATED AVERAGE GLUCOSE RESULT: 5.6 % — SIGNIFICANT CHANGE UP (ref 4–5.6)
ALBUMIN SERPL ELPH-MCNC: 4.7 G/DL — SIGNIFICANT CHANGE UP (ref 3.5–5.2)
ALP SERPL-CCNC: 71 U/L — SIGNIFICANT CHANGE UP (ref 30–115)
ALT FLD-CCNC: 14 U/L — SIGNIFICANT CHANGE UP (ref 0–41)
ANION GAP SERPL CALC-SCNC: 13 MMOL/L — SIGNIFICANT CHANGE UP (ref 7–14)
AST SERPL-CCNC: 21 U/L — SIGNIFICANT CHANGE UP (ref 0–41)
BILIRUB SERPL-MCNC: 0.5 MG/DL — SIGNIFICANT CHANGE UP (ref 0.2–1.2)
BUN SERPL-MCNC: 14 MG/DL — SIGNIFICANT CHANGE UP (ref 10–20)
CALCIUM SERPL-MCNC: 9.9 MG/DL — SIGNIFICANT CHANGE UP (ref 8.4–10.5)
CHLORIDE SERPL-SCNC: 101 MMOL/L — SIGNIFICANT CHANGE UP (ref 98–110)
CHOLEST SERPL-MCNC: 206 MG/DL — HIGH
CO2 SERPL-SCNC: 24 MMOL/L — SIGNIFICANT CHANGE UP (ref 17–32)
CREAT SERPL-MCNC: 1 MG/DL — SIGNIFICANT CHANGE UP (ref 0.7–1.5)
EGFR: 84 ML/MIN/1.73M2 — SIGNIFICANT CHANGE UP
ESTIMATED AVERAGE GLUCOSE: 114 MG/DL — SIGNIFICANT CHANGE UP (ref 68–114)
GLUCOSE SERPL-MCNC: 121 MG/DL — HIGH (ref 70–99)
HCT VFR BLD CALC: 37.2 % — LOW (ref 42–52)
HDLC SERPL-MCNC: 45 MG/DL — SIGNIFICANT CHANGE UP
HGB BLD-MCNC: 12.5 G/DL — LOW (ref 14–18)
LIPID PNL WITH DIRECT LDL SERPL: 143 MG/DL — HIGH
MAGNESIUM SERPL-MCNC: 2 MG/DL — SIGNIFICANT CHANGE UP (ref 1.8–2.4)
MCHC RBC-ENTMCNC: 31.1 PG — HIGH (ref 27–31)
MCHC RBC-ENTMCNC: 33.6 G/DL — SIGNIFICANT CHANGE UP (ref 32–37)
MCV RBC AUTO: 92.5 FL — SIGNIFICANT CHANGE UP (ref 80–94)
NON HDL CHOLESTEROL: 161 MG/DL — HIGH
NRBC # BLD: 0 /100 WBCS — SIGNIFICANT CHANGE UP (ref 0–0)
PLATELET # BLD AUTO: 232 K/UL — SIGNIFICANT CHANGE UP (ref 130–400)
PMV BLD: 9.1 FL — SIGNIFICANT CHANGE UP (ref 7.4–10.4)
POTASSIUM SERPL-MCNC: 4 MMOL/L — SIGNIFICANT CHANGE UP (ref 3.5–5)
POTASSIUM SERPL-SCNC: 4 MMOL/L — SIGNIFICANT CHANGE UP (ref 3.5–5)
PROT SERPL-MCNC: 7.5 G/DL — SIGNIFICANT CHANGE UP (ref 6–8)
RBC # BLD: 4.02 M/UL — LOW (ref 4.7–6.1)
RBC # FLD: 12.7 % — SIGNIFICANT CHANGE UP (ref 11.5–14.5)
SODIUM SERPL-SCNC: 138 MMOL/L — SIGNIFICANT CHANGE UP (ref 135–146)
TRIGL SERPL-MCNC: 88 MG/DL — SIGNIFICANT CHANGE UP
WBC # BLD: 4.77 K/UL — LOW (ref 4.8–10.8)
WBC # FLD AUTO: 4.77 K/UL — LOW (ref 4.8–10.8)

## 2024-10-24 PROCEDURE — 86780 TREPONEMA PALLIDUM: CPT

## 2024-10-24 PROCEDURE — 85027 COMPLETE CBC AUTOMATED: CPT

## 2024-10-24 PROCEDURE — 80053 COMPREHEN METABOLIC PANEL: CPT

## 2024-10-24 PROCEDURE — 36415 COLL VENOUS BLD VENIPUNCTURE: CPT

## 2024-10-24 PROCEDURE — 81003 URINALYSIS AUTO W/O SCOPE: CPT

## 2024-10-24 PROCEDURE — 86480 TB TEST CELL IMMUN MEASURE: CPT

## 2024-10-24 PROCEDURE — 80061 LIPID PANEL: CPT

## 2024-10-24 PROCEDURE — 83735 ASSAY OF MAGNESIUM: CPT

## 2024-10-24 PROCEDURE — 83036 HEMOGLOBIN GLYCOSYLATED A1C: CPT

## 2024-10-24 PROCEDURE — 80074 ACUTE HEPATITIS PANEL: CPT

## 2024-10-25 DIAGNOSIS — Z00.8 ENCOUNTER FOR OTHER GENERAL EXAMINATION: ICD-10-CM

## 2024-10-25 LAB
APPEARANCE UR: CLEAR — SIGNIFICANT CHANGE UP
BILIRUB UR-MCNC: NEGATIVE — SIGNIFICANT CHANGE UP
COLOR SPEC: YELLOW — SIGNIFICANT CHANGE UP
DIFF PNL FLD: NEGATIVE — SIGNIFICANT CHANGE UP
GLUCOSE UR QL: NEGATIVE MG/DL — SIGNIFICANT CHANGE UP
HAV IGM SER-ACNC: SIGNIFICANT CHANGE UP
HBV CORE IGM SER-ACNC: SIGNIFICANT CHANGE UP
HBV SURFACE AG SER-ACNC: SIGNIFICANT CHANGE UP
HCV AB S/CO SERPL IA: 0.11 S/CO — SIGNIFICANT CHANGE UP (ref 0–0.99)
HCV AB SERPL-IMP: SIGNIFICANT CHANGE UP
KETONES UR-MCNC: NEGATIVE MG/DL — SIGNIFICANT CHANGE UP
LEUKOCYTE ESTERASE UR-ACNC: NEGATIVE — SIGNIFICANT CHANGE UP
NITRITE UR-MCNC: NEGATIVE — SIGNIFICANT CHANGE UP
PH UR: 6.5 — SIGNIFICANT CHANGE UP (ref 5–8)
PROT UR-MCNC: NEGATIVE MG/DL — SIGNIFICANT CHANGE UP
SP GR SPEC: 1.02 — SIGNIFICANT CHANGE UP (ref 1–1.03)
T PALLIDUM AB TITR SER: NEGATIVE — SIGNIFICANT CHANGE UP
UROBILINOGEN FLD QL: 0.2 MG/DL — SIGNIFICANT CHANGE UP (ref 0.2–1)

## 2024-10-29 ENCOUNTER — NON-APPOINTMENT (OUTPATIENT)
Age: 64
End: 2024-10-29

## 2024-10-29 ENCOUNTER — APPOINTMENT (OUTPATIENT)
Dept: CARDIOLOGY | Facility: CLINIC | Age: 64
End: 2024-10-29
Payer: COMMERCIAL

## 2024-10-29 VITALS
DIASTOLIC BLOOD PRESSURE: 80 MMHG | SYSTOLIC BLOOD PRESSURE: 138 MMHG | BODY MASS INDEX: 25.61 KG/M2 | HEART RATE: 73 BPM | HEIGHT: 68 IN | WEIGHT: 169 LBS

## 2024-10-29 DIAGNOSIS — E78.2 MIXED HYPERLIPIDEMIA: ICD-10-CM

## 2024-10-29 DIAGNOSIS — E66.01 MORBID (SEVERE) OBESITY DUE TO EXCESS CALORIES: ICD-10-CM

## 2024-10-29 DIAGNOSIS — I10 ESSENTIAL (PRIMARY) HYPERTENSION: ICD-10-CM

## 2024-10-29 LAB
GAMMA INTERFERON BACKGROUND BLD IA-ACNC: 0.03 IU/ML — SIGNIFICANT CHANGE UP
M TB IFN-G BLD-IMP: NEGATIVE — SIGNIFICANT CHANGE UP
M TB IFN-G CD4+ BCKGRND COR BLD-ACNC: 0 IU/ML — SIGNIFICANT CHANGE UP
M TB IFN-G CD4+CD8+ BCKGRND COR BLD-ACNC: 0 IU/ML — SIGNIFICANT CHANGE UP
QUANT TB PLUS MITOGEN MINUS NIL: 1.48 IU/ML — SIGNIFICANT CHANGE UP

## 2024-10-29 PROCEDURE — 99213 OFFICE O/P EST LOW 20 MIN: CPT | Mod: 25

## 2024-10-29 PROCEDURE — 93000 ELECTROCARDIOGRAM COMPLETE: CPT

## 2024-11-12 ENCOUNTER — OUTPATIENT (OUTPATIENT)
Dept: OUTPATIENT SERVICES | Facility: HOSPITAL | Age: 64
LOS: 1 days | End: 2024-11-12
Payer: COMMERCIAL

## 2024-11-12 DIAGNOSIS — Z98.890 OTHER SPECIFIED POSTPROCEDURAL STATES: Chronic | ICD-10-CM

## 2024-11-12 DIAGNOSIS — I49.9 CARDIAC ARRHYTHMIA, UNSPECIFIED: ICD-10-CM

## 2024-11-12 PROCEDURE — 93005 ELECTROCARDIOGRAM TRACING: CPT

## 2024-11-12 PROCEDURE — 93010 ELECTROCARDIOGRAM REPORT: CPT

## 2024-11-13 DIAGNOSIS — I49.9 CARDIAC ARRHYTHMIA, UNSPECIFIED: ICD-10-CM

## 2025-05-15 ENCOUNTER — APPOINTMENT (OUTPATIENT)
Dept: CARDIOLOGY | Facility: CLINIC | Age: 65
End: 2025-05-15

## 2025-06-16 NOTE — ED PROCEDURE NOTE - CPROC ED INFUS LINE DETAIL1
REFERRAL APPROVAL NOTICE         Sent on June 16, 2025                   Kathy Dania Edmonds  [address]   [place] [address]                   Dear Ms. Edmonds,    After a careful review of the medical information and benefit coverage, [facility] has processed your referral. See below for additional details.    If applicable, you must be actively enrolled with your insurance for coverage of the authorized service. If you have any questions regarding your coverage, please contact your insurance directly.    REFERRAL INFORMATION   Referral #:  [id number]  Referred-To Provider    Referred-By Provider:  Otolaryngology    BRITTANY Ye LEAH J      [address]  [address]  [place] [address] [phone] [address]  [place] [address]  [phone]    Referral Start Date:  06/10/2025  Referral End Date:   06/10/2026             SCHEDULING  If you do not already have an appointment, please call [phone] to make an appointment.     MORE INFORMATION  If you do not already have a [employer] account, sign up at: [email or web address]  You can access your medical information, make appointments, see lab results, billing information, and more.  If you have questions regarding this referral, please contact  the [facility] Referrals department at:             [phone]. Monday - Friday 8:00AM - 5:00PM.     Sincerely,    [facility]     Ultrasound guidance was used during placement./All lumen(s) aspirated and flushed without difficulty./The guidewire was recovered./The location was identified, and the area was draped and prepped./The catheter was placed using sterile technique.